# Patient Record
Sex: FEMALE | Race: WHITE | NOT HISPANIC OR LATINO | Employment: OTHER | URBAN - METROPOLITAN AREA
[De-identification: names, ages, dates, MRNs, and addresses within clinical notes are randomized per-mention and may not be internally consistent; named-entity substitution may affect disease eponyms.]

---

## 2018-08-13 ENCOUNTER — OFFICE VISIT (OUTPATIENT)
Dept: URGENT CARE | Facility: CLINIC | Age: 78
End: 2018-08-13
Payer: MEDICARE

## 2018-08-13 VITALS
WEIGHT: 157 LBS | TEMPERATURE: 97.1 F | BODY MASS INDEX: 28.89 KG/M2 | SYSTOLIC BLOOD PRESSURE: 120 MMHG | HEART RATE: 56 BPM | DIASTOLIC BLOOD PRESSURE: 60 MMHG | HEIGHT: 62 IN | OXYGEN SATURATION: 95 %

## 2018-08-13 DIAGNOSIS — H61.23 BILATERAL IMPACTED CERUMEN: ICD-10-CM

## 2018-08-13 PROCEDURE — 99204 OFFICE O/P NEW MOD 45 MIN: CPT | Performed by: PHYSICIAN ASSISTANT

## 2018-08-13 RX ORDER — AMLODIPINE BESYLATE 5 MG/1
5 TABLET ORAL 2 TIMES DAILY
COMMUNITY
Start: 2018-07-30

## 2018-08-13 RX ORDER — LISINOPRIL 10 MG/1
10 TABLET ORAL DAILY
COMMUNITY
End: 2020-02-02

## 2018-08-13 RX ORDER — PRAVASTATIN SODIUM 40 MG
40 TABLET ORAL DAILY
COMMUNITY
Start: 2018-07-03

## 2018-08-13 RX ORDER — ATENOLOL 50 MG/1
50 TABLET ORAL 2 TIMES DAILY
Status: ON HOLD | COMMUNITY
Start: 2018-07-30 | End: 2020-02-07

## 2018-08-13 ASSESSMENT — ENCOUNTER SYMPTOMS
FEVER: 0
CHILLS: 0
WEIGHT LOSS: 0
SINUS PAIN: 0
SORE THROAT: 0
ABDOMINAL PAIN: 0
VOMITING: 0
DIARRHEA: 0
CONSTIPATION: 0
COUGH: 0
SHORTNESS OF BREATH: 0
NAUSEA: 0

## 2018-08-13 ASSESSMENT — PATIENT HEALTH QUESTIONNAIRE - PHQ9: CLINICAL INTERPRETATION OF PHQ2 SCORE: 0

## 2018-08-13 NOTE — PATIENT INSTRUCTIONS
Earwax Buildup  Your ears make a substance called earwax. It may also be called cerumen. Sometimes, too much earwax builds up in your ear canal. This can cause ear pain and make it harder for you to hear.  CAUSES  This condition is caused by too much earwax production or buildup.  RISK FACTORS  The following factors may make you more likely to develop this condition:  · Cleaning your ears often with swabs.  · Having narrow ear canals.  · Having earwax that is overly thick or sticky.  · Having eczema.  · Being dehydrated.  SYMPTOMS  Symptoms of this condition include:  · Reduced hearing.  · Ear drainage.  · Ear pain.  · Ear itch.  · A feeling of fullness in the ear or feeling that the ear is plugged.  · Ringing in the ear.  · Coughing.  DIAGNOSIS  Your health care provider can diagnose this condition based on your symptoms and medical history. Your health care provider will also do an ear exam to look inside your ear with a scope (otoscope). You may also have a hearing test.  TREATMENT  Treatment for this condition includes:  · Over-the-counter or prescription ear drops to soften the earwax.  · Earwax removal by a health care provider. This may be done:  ¨ By flushing the ear with body-temperature water.  ¨ With a medical instrument that has a loop at the end (earwax curette).  ¨ With a suction device.  HOME CARE INSTRUCTIONS  · Take over-the-counter and prescription medicines only as told by your health care provider.  · Do not put any objects, including an ear swab, into your ear. You can clean the opening of your ear canal with a washcloth.  · Drink enough water to keep your urine clear or pale yellow.  · If you have frequent earwax buildup or you use hearing aids, consider seeing your health care provider every 6-12 months for routine preventive ear cleanings. Keep all follow-up visits as told by your health care provider.  SEEK MEDICAL CARE IF:  · You have ear pain.  · Your condition does not improve with  treatment.  · You have hearing loss.  · You have blood, pus, or other fluid coming from your ear.  This information is not intended to replace advice given to you by your health care provider. Make sure you discuss any questions you have with your health care provider.  Document Released: 01/25/2006 Document Revised: 04/10/2017 Document Reviewed: 08/03/2016  [employer] Interactive Patient Education © 2017 [employer]

## 2018-08-13 NOTE — PROGRESS NOTES
"Subjective:   Shama Herrera is a 77 y.o. female who presents for Cerumen Impaction     Ear Fullness   This is a new problem. The current episode started more than 1 month ago. The problem has been gradually worsening. Pertinent negatives include no abdominal pain, chills, coughing, fever, nausea, sore throat or vomiting. Nothing aggravates the symptoms. She has tried nothing for the symptoms.     Pt recently had hearing test done at [facility] where she was told her ears are cerumen impacted. She has noted significant hearing loss in L ear. No fever, chills or pain.     Review of Systems   Constitutional: Negative for chills, fever, malaise/fatigue and weight loss.   HENT: Positive for hearing loss. Negative for ear discharge, ear pain, sinus pain and sore throat.    Respiratory: Negative for cough and shortness of breath.    Gastrointestinal: Negative for abdominal pain, constipation, diarrhea, nausea and vomiting.   Genitourinary: Negative for dysuria.   All other systems reviewed and are negative.      PMH:  has no past medical history on file.  MEDS:   Current Outpatient Prescriptions:   •  lisinopril (PRINIVIL) 10 MG Tab, Take 10 mg by mouth every day., Disp: , Rfl:   •  amLODIPine (NORVASC) 5 MG Tab, Take 5 mg by mouth every day., Disp: , Rfl:   •  atenolol (TENORMIN) 50 MG Tab, Take 50 mg by mouth every day., Disp: , Rfl:   •  pravastatin (PRAVACHOL) 40 MG tablet, Take 40 mg by mouth every day., Disp: , Rfl:   ALLERGIES: Not on File  SURGHX: History reviewed. No pertinent surgical history.  SOCHX:  reports that she has never smoked. She has never used smokeless tobacco. She reports that she does not drink alcohol.  History reviewed. No pertinent family history.     Objective:   /60   Pulse (!) 56   Temp 36.2 °C (97.1 °F)   Ht 1.562 m (5' 1.5\")   Wt 71.2 kg (157 lb)   SpO2 95%   Breastfeeding? No   BMI 29.18 kg/m²     Physical Exam   Constitutional: She is oriented to person, place, and time. She appears " well-developed and well-nourished. No distress.   HENT:   Head: Normocephalic and atraumatic.   Right Ear: No swelling or tenderness. Tympanic membrane is not erythematous.   Left Ear: No swelling or tenderness. Tympanic membrane is not erythematous.   Bilateral cerumen impaction.    Eyes: Pupils are equal, round, and reactive to light. Conjunctivae are normal.   Cardiovascular: Normal rate and regular rhythm.    Pulmonary/Chest: Effort normal and breath sounds normal.   Neurological: She is alert and oriented to person, place, and time.   Skin: Skin is warm and dry.   Psychiatric: She has a normal mood and affect. Her behavior is normal.   Vitals reviewed.      Assessment/Plan:     1. Bilateral impacted cerumen         Significant cerumen removal of the L canal completed by MA using irrigation technique.  After multiple attempts the right canal cerumen unable to be irrigated. Recommended cerumen softening drops or olive oi drops daily x 4 days return to clinic for second attmept at cerumen removal. External canals sustain mild erythema no active bleeding present    Follow-up in 4 days, emergency room precautions discussed.  Patient appears understanding of information.     Case reviewed and discussed with Dk Alvarez PA-C during DONAL Cobian's training period.    And recommendations were discussed with the treating provider.     Dk Alvarez PA-C

## 2018-08-16 ENCOUNTER — OFFICE VISIT (OUTPATIENT)
Dept: URGENT CARE | Facility: CLINIC | Age: 78
End: 2018-08-16
Payer: MEDICARE

## 2018-08-16 VITALS
DIASTOLIC BLOOD PRESSURE: 70 MMHG | HEIGHT: 62 IN | OXYGEN SATURATION: 99 % | SYSTOLIC BLOOD PRESSURE: 118 MMHG | TEMPERATURE: 97.7 F | WEIGHT: 157 LBS | BODY MASS INDEX: 28.89 KG/M2 | HEART RATE: 54 BPM | RESPIRATION RATE: 16 BRPM

## 2018-08-16 DIAGNOSIS — H61.21 IMPACTED CERUMEN OF RIGHT EAR: ICD-10-CM

## 2018-08-16 PROCEDURE — 99212 OFFICE O/P EST SF 10 MIN: CPT | Performed by: NURSE PRACTITIONER

## 2018-08-16 ASSESSMENT — ENCOUNTER SYMPTOMS
CHILLS: 0
FEVER: 0
MYALGIAS: 0
HEADACHES: 0
DIZZINESS: 0
NAUSEA: 0

## 2018-08-16 ASSESSMENT — PAIN SCALES - GENERAL: PAINLEVEL: NO PAIN

## 2018-08-16 NOTE — PROGRESS NOTES
"Subjective:      Shama Herrera is a 77 y.o. female who presents with Ear Fullness            HPI Recurrent problem. 77 year old female with ear fullness of right ear. She was here on Monday and attempt at that time to clear was unsuccessful and she was told to use debrox but she did not. She is not having any hearing loss, dizziness, headache or nausea.  Patient has no known allergies.  Current Outpatient Prescriptions on File Prior to Visit   Medication Sig Dispense Refill   • lisinopril (PRINIVIL) 10 MG Tab Take 10 mg by mouth every day.     • amLODIPine (NORVASC) 5 MG Tab Take 5 mg by mouth every day.     • atenolol (TENORMIN) 50 MG Tab Take 50 mg by mouth every day.     • pravastatin (PRAVACHOL) 40 MG tablet Take 40 mg by mouth every day.       No current facility-administered medications on file prior to visit.      Social History     Social History   • Marital status:      Spouse name: N/A   • Number of children: N/A   • Years of education: N/A     Occupational History   • Not on file.     Social History Main Topics   • Smoking status: Never Smoker   • Smokeless tobacco: Never Used   • Alcohol use No   • Drug use: Unknown   • Sexual activity: Not on file     Other Topics Concern   • Not on file     Social History Narrative   • No narrative on file     family history is not on file.      Review of Systems   Constitutional: Negative for chills, fever and malaise/fatigue.   HENT: Negative for ear pain, hearing loss and tinnitus.    Gastrointestinal: Negative for nausea.   Musculoskeletal: Negative for myalgias.   Neurological: Negative for dizziness and headaches.          Objective:     /70   Pulse (!) 54   Temp 36.5 °C (97.7 °F)   Resp 16   Ht 1.562 m (5' 1.5\")   Wt 71.2 kg (157 lb)   SpO2 99%   Breastfeeding? No   BMI 29.18 kg/m²      Physical Exam   Constitutional: She appears well-developed and well-nourished.   HENT:   Head: Normocephalic and atraumatic.   Left Ear: External ear normal. "   Mouth/Throat: Oropharynx is clear and moist.   Right eac occluded with cerumen.   Musculoskeletal: Normal range of motion.   Moves all 4 extremities normally   Neurological: She is alert.   Skin: Skin is warm and dry.   Psychiatric: She has a normal mood and affect. Her behavior is normal. Thought content normal.   Nursing note and vitals reviewed.              Assessment/Plan:     1. Impacted cerumen of right ear       Clear after irrigation.  Follow up as needed.

## 2019-08-09 ENCOUNTER — HOSPITAL ENCOUNTER (OUTPATIENT)
Dept: HOSPITAL 8 - CFH | Age: 79
Discharge: HOME | End: 2019-08-09
Attending: FAMILY MEDICINE
Payer: MEDICARE

## 2019-08-09 DIAGNOSIS — M85.88: Primary | ICD-10-CM

## 2019-08-09 PROCEDURE — 77080 DXA BONE DENSITY AXIAL: CPT

## 2019-10-23 ENCOUNTER — APPOINTMENT (RX ONLY)
Dept: URBAN - METROPOLITAN AREA CLINIC 20 | Facility: CLINIC | Age: 79
Setting detail: DERMATOLOGY
End: 2019-10-23

## 2019-10-23 DIAGNOSIS — L90.8 OTHER ATROPHIC DISORDERS OF SKIN: ICD-10-CM

## 2019-10-23 DIAGNOSIS — L82.1 OTHER SEBORRHEIC KERATOSIS: ICD-10-CM

## 2019-10-23 DIAGNOSIS — L72.8 OTHER FOLLICULAR CYSTS OF THE SKIN AND SUBCUTANEOUS TISSUE: ICD-10-CM

## 2019-10-23 DIAGNOSIS — D22 MELANOCYTIC NEVI: ICD-10-CM

## 2019-10-23 PROBLEM — D22.4 MELANOCYTIC NEVI OF SCALP AND NECK: Status: ACTIVE | Noted: 2019-10-23

## 2019-10-23 PROCEDURE — 99202 OFFICE O/P NEW SF 15 MIN: CPT

## 2019-10-23 PROCEDURE — ? ADDITIONAL NOTES

## 2019-10-23 PROCEDURE — ? COUNSELING

## 2019-10-23 ASSESSMENT — LOCATION ZONE DERM
LOCATION ZONE: TRUNK
LOCATION ZONE: FACE
LOCATION ZONE: NECK

## 2019-10-23 ASSESSMENT — LOCATION DETAILED DESCRIPTION DERM
LOCATION DETAILED: RIGHT SUPERIOR MEDIAL FOREHEAD
LOCATION DETAILED: RIGHT MEDIAL TRAPEZIAL NECK
LOCATION DETAILED: RIGHT SUPERIOR UPPER BACK

## 2019-10-23 ASSESSMENT — LOCATION SIMPLE DESCRIPTION DERM
LOCATION SIMPLE: POSTERIOR NECK
LOCATION SIMPLE: RIGHT UPPER BACK
LOCATION SIMPLE: RIGHT FOREHEAD

## 2019-10-23 NOTE — PROCEDURE: ADDITIONAL NOTES
Additional Notes: Recommended Cera Ve moisturizing cream for daily maintenance.
Detail Level: Simple

## 2020-01-20 ENCOUNTER — APPOINTMENT (OUTPATIENT)
Dept: RADIOLOGY | Facility: MEDICAL CENTER | Age: 80
End: 2020-01-20
Attending: EMERGENCY MEDICINE
Payer: MEDICARE

## 2020-01-20 ENCOUNTER — HOSPITAL ENCOUNTER (EMERGENCY)
Facility: MEDICAL CENTER | Age: 80
End: 2020-01-20
Attending: EMERGENCY MEDICINE
Payer: MEDICARE

## 2020-01-20 VITALS
OXYGEN SATURATION: 96 % | HEIGHT: 61 IN | BODY MASS INDEX: 29.8 KG/M2 | SYSTOLIC BLOOD PRESSURE: 143 MMHG | WEIGHT: 157.85 LBS | TEMPERATURE: 98.2 F | RESPIRATION RATE: 26 BRPM | HEART RATE: 57 BPM | DIASTOLIC BLOOD PRESSURE: 52 MMHG

## 2020-01-20 DIAGNOSIS — S52.502A CLOSED FRACTURE OF DISTAL END OF LEFT RADIUS, UNSPECIFIED FRACTURE MORPHOLOGY, INITIAL ENCOUNTER: ICD-10-CM

## 2020-01-20 LAB
ANION GAP SERPL CALC-SCNC: 11 MMOL/L (ref 0–11.9)
APTT PPP: 36.5 SEC (ref 24.7–36)
BASOPHILS # BLD AUTO: 0.3 % (ref 0–1.8)
BASOPHILS # BLD: 0.03 K/UL (ref 0–0.12)
BUN SERPL-MCNC: 30 MG/DL (ref 8–22)
CALCIUM SERPL-MCNC: 9.9 MG/DL (ref 8.4–10.2)
CHLORIDE SERPL-SCNC: 106 MMOL/L (ref 96–112)
CO2 SERPL-SCNC: 25 MMOL/L (ref 20–33)
CREAT SERPL-MCNC: 1.06 MG/DL (ref 0.5–1.4)
EOSINOPHIL # BLD AUTO: 0.13 K/UL (ref 0–0.51)
EOSINOPHIL NFR BLD: 1.4 % (ref 0–6.9)
ERYTHROCYTE [DISTWIDTH] IN BLOOD BY AUTOMATED COUNT: 45.6 FL (ref 35.9–50)
GLUCOSE SERPL-MCNC: 126 MG/DL (ref 65–99)
HCT VFR BLD AUTO: 40.6 % (ref 37–47)
HGB BLD-MCNC: 13.1 G/DL (ref 12–16)
IMM GRANULOCYTES # BLD AUTO: 0.02 K/UL (ref 0–0.11)
IMM GRANULOCYTES NFR BLD AUTO: 0.2 % (ref 0–0.9)
INR PPP: 1.02 (ref 0.87–1.13)
LYMPHOCYTES # BLD AUTO: 1.25 K/UL (ref 1–4.8)
LYMPHOCYTES NFR BLD: 13.3 % (ref 22–41)
MCH RBC QN AUTO: 29.5 PG (ref 27–33)
MCHC RBC AUTO-ENTMCNC: 32.3 G/DL (ref 33.6–35)
MCV RBC AUTO: 91.4 FL (ref 81.4–97.8)
MONOCYTES # BLD AUTO: 0.83 K/UL (ref 0–0.85)
MONOCYTES NFR BLD AUTO: 8.9 % (ref 0–13.4)
NEUTROPHILS # BLD AUTO: 7.11 K/UL (ref 2–7.15)
NEUTROPHILS NFR BLD: 75.9 % (ref 44–72)
NRBC # BLD AUTO: 0 K/UL
NRBC BLD-RTO: 0 /100 WBC
PLATELET # BLD AUTO: 182 K/UL (ref 164–446)
PMV BLD AUTO: 12.1 FL (ref 9–12.9)
POTASSIUM SERPL-SCNC: 4.4 MMOL/L (ref 3.6–5.5)
PROTHROMBIN TIME: 13.5 SEC (ref 12–14.6)
RBC # BLD AUTO: 4.44 M/UL (ref 4.2–5.4)
SODIUM SERPL-SCNC: 142 MMOL/L (ref 135–145)
WBC # BLD AUTO: 9.4 K/UL (ref 4.8–10.8)

## 2020-01-20 PROCEDURE — 85610 PROTHROMBIN TIME: CPT

## 2020-01-20 PROCEDURE — 700101 HCHG RX REV CODE 250

## 2020-01-20 PROCEDURE — 96375 TX/PRO/DX INJ NEW DRUG ADDON: CPT | Mod: XU

## 2020-01-20 PROCEDURE — 25605 CLTX DST RDL FX/EPHYS SEP W/: CPT

## 2020-01-20 PROCEDURE — 96374 THER/PROPH/DIAG INJ IV PUSH: CPT | Mod: XU

## 2020-01-20 PROCEDURE — 73100 X-RAY EXAM OF WRIST: CPT | Mod: LT

## 2020-01-20 PROCEDURE — 700111 HCHG RX REV CODE 636 W/ 250 OVERRIDE (IP): Performed by: EMERGENCY MEDICINE

## 2020-01-20 PROCEDURE — 80048 BASIC METABOLIC PNL TOTAL CA: CPT

## 2020-01-20 PROCEDURE — 99152 MOD SED SAME PHYS/QHP 5/>YRS: CPT

## 2020-01-20 PROCEDURE — 99285 EMERGENCY DEPT VISIT HI MDM: CPT

## 2020-01-20 PROCEDURE — A9270 NON-COVERED ITEM OR SERVICE: HCPCS | Performed by: EMERGENCY MEDICINE

## 2020-01-20 PROCEDURE — 73130 X-RAY EXAM OF HAND: CPT | Mod: LT

## 2020-01-20 PROCEDURE — 73110 X-RAY EXAM OF WRIST: CPT | Mod: LT

## 2020-01-20 PROCEDURE — 700102 HCHG RX REV CODE 250 W/ 637 OVERRIDE(OP): Performed by: EMERGENCY MEDICINE

## 2020-01-20 PROCEDURE — 85025 COMPLETE CBC W/AUTO DIFF WBC: CPT

## 2020-01-20 PROCEDURE — 85730 THROMBOPLASTIN TIME PARTIAL: CPT

## 2020-01-20 RX ORDER — ONDANSETRON 2 MG/ML
4 INJECTION INTRAMUSCULAR; INTRAVENOUS ONCE
Status: COMPLETED | OUTPATIENT
Start: 2020-01-20 | End: 2020-01-20

## 2020-01-20 RX ORDER — IBUPROFEN 600 MG/1
600 TABLET ORAL ONCE
Status: COMPLETED | OUTPATIENT
Start: 2020-01-20 | End: 2020-01-20

## 2020-01-20 RX ORDER — KETAMINE HYDROCHLORIDE 50 MG/ML
1 INJECTION, SOLUTION INTRAMUSCULAR; INTRAVENOUS ONCE
Status: DISCONTINUED | OUTPATIENT
Start: 2020-01-20 | End: 2020-01-21 | Stop reason: HOSPADM

## 2020-01-20 RX ORDER — OXYCODONE HYDROCHLORIDE 5 MG/1
5 TABLET ORAL EVERY 4 HOURS PRN
Qty: 20 TAB | Refills: 0 | Status: SHIPPED | OUTPATIENT
Start: 2020-01-20 | End: 2020-01-27

## 2020-01-20 RX ORDER — ACETAMINOPHEN 325 MG/1
650 TABLET ORAL ONCE
Status: COMPLETED | OUTPATIENT
Start: 2020-01-20 | End: 2020-01-20

## 2020-01-20 RX ORDER — KETAMINE HYDROCHLORIDE 50 MG/ML
1 INJECTION, SOLUTION INTRAMUSCULAR; INTRAVENOUS ONCE
Status: COMPLETED | OUTPATIENT
Start: 2020-01-20 | End: 2020-01-20

## 2020-01-20 RX ORDER — KETAMINE HYDROCHLORIDE 50 MG/ML
INJECTION, SOLUTION INTRAMUSCULAR; INTRAVENOUS
Status: COMPLETED
Start: 2020-01-20 | End: 2020-01-20

## 2020-01-20 RX ADMIN — KETAMINE HYDROCHLORIDE 71.5 MG: 50 INJECTION, SOLUTION INTRAMUSCULAR; INTRAVENOUS at 19:40

## 2020-01-20 RX ADMIN — KETAMINE HYDROCHLORIDE 71.5 MG: 50 INJECTION INTRAMUSCULAR; INTRAVENOUS at 19:40

## 2020-01-20 RX ADMIN — ONDANSETRON 4 MG: 2 INJECTION INTRAMUSCULAR; INTRAVENOUS at 18:59

## 2020-01-20 RX ADMIN — IBUPROFEN 600 MG: 600 TABLET ORAL at 21:11

## 2020-01-20 RX ADMIN — ACETAMINOPHEN 650 MG: 325 TABLET, FILM COATED ORAL at 21:11

## 2020-01-20 RX ADMIN — FENTANYL CITRATE 50 MCG: 50 INJECTION INTRAMUSCULAR; INTRAVENOUS at 17:55

## 2020-01-20 ASSESSMENT — PAIN DESCRIPTION - DESCRIPTORS: DESCRIPTORS: SHARP

## 2020-01-21 NOTE — ED TRIAGE NOTES
Pt c/o fractured L wrist about 1 hour ago. Pt states that she tripped over carpet and landed on her wrist. Deformity noted. Pt denies falling due to dizziness. Pt denies LOC. Pt maintains good CMS to left fingers.

## 2020-01-21 NOTE — RESPIRATORY CARE
Conscious sedation given for wrist reduction. Patient tolerated procedure well on 4-5LPM with O2 sats 92-98%. ETCO2 20-32. RR 10-20 throughout procedure. Jaw thrust performed for 3 minutes.

## 2020-01-21 NOTE — DISCHARGE INSTRUCTIONS
You were seen in the ER for a break of your left wrist.  We attempted to reduce the bone to put it back in its place which only partially worked.  You will need to follow-up with our orthopedic surgery team and I have spoken with the orthopedic surgeon on-call who is requested that you follow-up this Wednesday.  Please call their office to make an appointment.  We have placed a splint on your wrist, please keep it clean and dry.  I recommend taking Tylenol and/or ibuprofen as directed on the bottle for pain control.  I have given you a small prescription for oxycodone which is a narcotic/opioid pain medication.  Please do not take this medication and drink alcohol or drive as it will make you sleepy.  If you develop any new or worsening symptoms you should return immediately to the ER.

## 2020-01-21 NOTE — ED PROVIDER NOTES
"ED Provider Note    CHIEF COMPLAINT  Chief Complaint   Patient presents with   • Fall   • Wrist Injury     Left       HPI  Shama Herrera is a 79 y.o. left hand dominant female who presents with a chief complaint of left wrist pain and deformity.  The patient reports she was in her baseline state of health until just prior to arrival when she tripped it over a carpet and landed on her left wrist.  She had immediate pain in the left wrist.  She did not hit her head nor did she lose consciousness.  She is not anticoagulated.  She has not taken any medication for her symptoms.  No other complaints.    REVIEW OF SYSTEMS  See HPI for further details.  Left wrist pain and deformity.  All other systems are negative.     PAST MEDICAL HISTORY       SOCIAL HISTORY  Social History     Tobacco Use   • Smoking status: Never Smoker   • Smokeless tobacco: Never Used   Substance and Sexual Activity   • Alcohol use: No   • Drug use: Not on file   • Sexual activity: Not on file       SURGICAL HISTORY  patient denies any surgical history    CURRENT MEDICATIONS  Home Medications    **Home medications have not yet been reviewed for this encounter**         ALLERGIES  No Known Allergies    PHYSICAL EXAM  VITAL SIGNS: BP (!) 164/63   Pulse (!) 56   Temp 36.8 °C (98.2 °F) (Temporal)   Resp 16   Ht 1.549 m (5' 1\")   Wt 71.6 kg (157 lb 13.6 oz)   SpO2 92%   BMI 29.83 kg/m²    Pulse ox interpretation: I interpret this pulse ox as normal.  Constitutional: Alert in no apparent distress.  HENT: No signs of trauma, Bilateral external ears normal, Nose normal.  Moist mucous membranes.  Eyes: Pupils are equal and reactive, Conjunctiva normal, Non-icteric.   Neck: Normal range of motion, No tenderness, Supple, No stridor.   Lymphatic: No lymphadenopathy noted.   Cardiovascular: Regular rate and rhythm, no murmurs.  2+ left radial pulse.  Thorax & Lungs: Normal breath sounds, No respiratory distress, No wheezing, No chest tenderness.   Abdomen: " Bowel sounds normal, Soft, No tenderness, No masses, No pulsatile masses. No peritoneal signs.  Skin: Warm, Dry, No erythema, No rash.   Back: Normal alignment.  Extremities: Intact distal pulses, left wrist deformity with associated tenderness.  Musculoskeletal: Decreased range of motion of left wrist secondary to pain.   Neurologic: Alert, Normal motor function, normal sensory.  Psychiatric: Affect normal, Judgment normal, Mood normal.       DIAGNOSTIC STUDIES / PROCEDURES    CONSCIOUS SEDATION PROCEDURE NOTE:  Patient identification was confirmed and patient consent was obtained.  The procedure was performed by Dr. Nichols.  Anesthetic used: Ketamine  Length of Time: 25 minutes  Other medications administered: None  Pre-procedure neuro examination revealed no deficits. Patient's airway remained patent, O2SAT adequate through procedure. Vitals remained stable. Patient tolerated procedure well without complications. Post-procedure exam showed patient w/o cranial deficits. Sensory and motor intact. Patient returned to baseline prior to disposition.  Instructions for care discussed verbally and pt provided with additional written instructions for homecare and f/u.    FRACTURE REDUCTION  Reduction was accomplished using direct traction. The left arm was placed in a sugartong splint. Patient was neurovascularly intact pre- and post-procedure. Patient tolerated the procedure well. Post-procedure xrays reveals mild improvement of distal radius fracture.    LABS  CBC  BMP  PT/INR  PTT    RADIOLOGY  Xray left wrist  Xray left hand    COURSE & MEDICAL DECISION MAKING  Pertinent Labs & Imaging studies reviewed. (See chart for details)    This is a 79 year old female here with obvious left wrist fracture s/p mechanical GLF. Patient adamant that she tripped over a rug. Did not hit her head. No symptoms prior to falling. She is neurovascularly intact in the LUE. No elbow or shoulder tenderness. Deformity to the left wrist.      Xrays confirm distal radius fracture with likely scapholunate ligamentous injury due to widening of the scapho-lunate space.    Spoke with Dr. Sousa, on-call orthopedist, who is comfortable with plan for sedation, reduction, and outpatient management.     Reduction performed as above with only mild improvement of the alignment. Spoke again with Dr. Sousa who viewed the post-reduction films. Recommendation is to have the patient follow up in clinic. She will likely need operative management.    Patient resting comfortably. She has tolerated PO. She has ambulated. Pain is well managed. Follow up plan was discussed with the patient. She was given a sling. She should take tylenol/ibuprofen for pain control. She was given a prescription for oxycodone for breakthrough pain. Keep the wrist elevated. Given the contact information for ortho clinic. Return to the ER with any new or worsening symptoms.    The patient will not drink alcohol nor drive with prescribed medications. The patient will return for worsening symptoms and is stable at the time of discharge. The patient verbalizes understanding and will comply.    FINAL IMPRESSION  1. Distal left radius fracture    Electronically signed by: Matt Nichols M.D., 1/20/2020 5:49 PM

## 2020-01-23 ENCOUNTER — HOSPITAL ENCOUNTER (OUTPATIENT)
Dept: HOSPITAL 8 - OUT | Age: 80
Discharge: HOME | End: 2020-01-23
Attending: ORTHOPAEDIC SURGERY
Payer: MEDICARE

## 2020-01-23 VITALS — DIASTOLIC BLOOD PRESSURE: 74 MMHG | SYSTOLIC BLOOD PRESSURE: 157 MMHG

## 2020-01-23 VITALS — WEIGHT: 158.07 LBS | BODY MASS INDEX: 28.01 KG/M2 | HEIGHT: 63 IN

## 2020-01-23 DIAGNOSIS — Z72.89: ICD-10-CM

## 2020-01-23 DIAGNOSIS — I10: ICD-10-CM

## 2020-01-23 DIAGNOSIS — S52.552A: Primary | ICD-10-CM

## 2020-01-23 DIAGNOSIS — J44.9: ICD-10-CM

## 2020-01-23 DIAGNOSIS — E78.00: ICD-10-CM
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
## 2020-01-23 LAB
ALBUMIN SERPL-MCNC: 3.7 G/DL (ref 3.4–5)
ALP SERPL-CCNC: 71 U/L (ref 45–117)
ALT SERPL-CCNC: 13 U/L (ref 12–78)
ANION GAP SERPL CALC-SCNC: 7 MMOL/L (ref 5–15)
BILIRUB SERPL-MCNC: 0.7 MG/DL (ref 0.2–1)
CALCIUM SERPL-MCNC: 9.4 MG/DL (ref 8.5–10.1)
CHLORIDE SERPL-SCNC: 109 MMOL/L (ref 98–107)
CREAT SERPL-MCNC: 1.06 MG/DL (ref 0.55–1.02)
PROT SERPL-MCNC: 7.7 G/DL (ref 6.4–8.2)

## 2020-01-23 PROCEDURE — 93005 ELECTROCARDIOGRAM TRACING: CPT

## 2020-01-23 PROCEDURE — 25607 OPTX DST RD XARTC FX/EPI SEP: CPT

## 2020-01-23 PROCEDURE — 80053 COMPREHEN METABOLIC PANEL: CPT

## 2020-01-23 PROCEDURE — 36415 COLL VENOUS BLD VENIPUNCTURE: CPT

## 2020-01-23 PROCEDURE — 73100 X-RAY EXAM OF WRIST: CPT

## 2020-01-23 PROCEDURE — C1713 ANCHOR/SCREW BN/BN,TIS/BN: HCPCS

## 2020-01-23 PROCEDURE — 64415 NJX AA&/STRD BRCH PLXS IMG: CPT

## 2020-01-23 PROCEDURE — 76000 FLUOROSCOPY <1 HR PHYS/QHP: CPT

## 2020-02-02 ENCOUNTER — HOSPITAL ENCOUNTER (INPATIENT)
Facility: MEDICAL CENTER | Age: 80
LOS: 7 days | DRG: 872 | End: 2020-02-10
Attending: EMERGENCY MEDICINE | Admitting: HOSPITALIST
Payer: MEDICARE

## 2020-02-02 ENCOUNTER — APPOINTMENT (OUTPATIENT)
Dept: RADIOLOGY | Facility: MEDICAL CENTER | Age: 80
DRG: 872 | End: 2020-02-02
Attending: EMERGENCY MEDICINE
Payer: MEDICARE

## 2020-02-02 DIAGNOSIS — S52.502A CLOSED FRACTURE OF DISTAL END OF LEFT RADIUS, UNSPECIFIED FRACTURE MORPHOLOGY, INITIAL ENCOUNTER: ICD-10-CM

## 2020-02-02 DIAGNOSIS — I10 ESSENTIAL HYPERTENSION: ICD-10-CM

## 2020-02-02 DIAGNOSIS — R53.1 GENERALIZED WEAKNESS: ICD-10-CM

## 2020-02-02 DIAGNOSIS — S52.501A CLOSED FRACTURE OF DISTAL END OF RIGHT RADIUS, UNSPECIFIED FRACTURE MORPHOLOGY, INITIAL ENCOUNTER: ICD-10-CM

## 2020-02-02 PROBLEM — D72.829 LEUKOCYTOSIS: Status: ACTIVE | Noted: 2020-02-02

## 2020-02-02 LAB
ALBUMIN SERPL BCP-MCNC: 3.4 G/DL (ref 3.2–4.9)
ALBUMIN/GLOB SERPL: 0.9 G/DL
ALP SERPL-CCNC: 81 U/L (ref 30–99)
ALT SERPL-CCNC: 10 U/L (ref 2–50)
ANION GAP SERPL CALC-SCNC: 14 MMOL/L (ref 0–11.9)
APPEARANCE UR: ABNORMAL
AST SERPL-CCNC: 19 U/L (ref 12–45)
BACTERIA #/AREA URNS HPF: ABNORMAL /HPF
BASOPHILS # BLD AUTO: 0.2 % (ref 0–1.8)
BASOPHILS # BLD: 0.03 K/UL (ref 0–0.12)
BILIRUB SERPL-MCNC: 0.6 MG/DL (ref 0.1–1.5)
BILIRUB UR QL STRIP.AUTO: NEGATIVE
BUN SERPL-MCNC: 20 MG/DL (ref 8–22)
CALCIUM SERPL-MCNC: 9.8 MG/DL (ref 8.4–10.2)
CHLORIDE SERPL-SCNC: 106 MMOL/L (ref 96–112)
CO2 SERPL-SCNC: 22 MMOL/L (ref 20–33)
COLOR UR: YELLOW
CREAT SERPL-MCNC: 0.82 MG/DL (ref 0.5–1.4)
EOSINOPHIL # BLD AUTO: 0.01 K/UL (ref 0–0.51)
EOSINOPHIL NFR BLD: 0.1 % (ref 0–6.9)
EPI CELLS #/AREA URNS HPF: ABNORMAL /HPF
ERYTHROCYTE [DISTWIDTH] IN BLOOD BY AUTOMATED COUNT: 46.4 FL (ref 35.9–50)
GLOBULIN SER CALC-MCNC: 3.9 G/DL (ref 1.9–3.5)
GLUCOSE SERPL-MCNC: 115 MG/DL (ref 65–99)
GLUCOSE UR STRIP.AUTO-MCNC: NEGATIVE MG/DL
HCT VFR BLD AUTO: 37.2 % (ref 37–47)
HGB BLD-MCNC: 11.5 G/DL (ref 12–16)
IMM GRANULOCYTES # BLD AUTO: 0.05 K/UL (ref 0–0.11)
IMM GRANULOCYTES NFR BLD AUTO: 0.4 % (ref 0–0.9)
KETONES UR STRIP.AUTO-MCNC: ABNORMAL MG/DL
LEUKOCYTE ESTERASE UR QL STRIP.AUTO: ABNORMAL
LYMPHOCYTES # BLD AUTO: 0.85 K/UL (ref 1–4.8)
LYMPHOCYTES NFR BLD: 6.7 % (ref 22–41)
MCH RBC QN AUTO: 29 PG (ref 27–33)
MCHC RBC AUTO-ENTMCNC: 30.9 G/DL (ref 33.6–35)
MCV RBC AUTO: 93.7 FL (ref 81.4–97.8)
MICRO URNS: ABNORMAL
MONOCYTES # BLD AUTO: 1.13 K/UL (ref 0–0.85)
MONOCYTES NFR BLD AUTO: 8.9 % (ref 0–13.4)
MUCOUS THREADS #/AREA URNS HPF: ABNORMAL /HPF
NEUTROPHILS # BLD AUTO: 10.66 K/UL (ref 2–7.15)
NEUTROPHILS NFR BLD: 83.7 % (ref 44–72)
NITRITE UR QL STRIP.AUTO: POSITIVE
NRBC # BLD AUTO: 0 K/UL
NRBC BLD-RTO: 0 /100 WBC
PH UR STRIP.AUTO: 5.5 [PH] (ref 5–8)
PLATELET # BLD AUTO: 147 K/UL (ref 164–446)
PMV BLD AUTO: 12.1 FL (ref 9–12.9)
POTASSIUM SERPL-SCNC: 4 MMOL/L (ref 3.6–5.5)
PROT SERPL-MCNC: 7.3 G/DL (ref 6–8.2)
PROT UR QL STRIP: NEGATIVE MG/DL
RBC # BLD AUTO: 3.97 M/UL (ref 4.2–5.4)
RBC # URNS HPF: ABNORMAL /HPF
RBC UR QL AUTO: ABNORMAL
SODIUM SERPL-SCNC: 142 MMOL/L (ref 135–145)
SP GR UR STRIP.AUTO: 1.02
T4 FREE SERPL-MCNC: 1.29 NG/DL (ref 0.58–1.64)
TROPONIN T SERPL-MCNC: 24 NG/L (ref 6–19)
TROPONIN T SERPL-MCNC: 28 NG/L (ref 6–19)
TSH SERPL DL<=0.005 MIU/L-ACNC: 0.25 UIU/ML (ref 0.38–5.33)
WBC # BLD AUTO: 12.7 K/UL (ref 4.8–10.8)
WBC #/AREA URNS HPF: ABNORMAL /HPF

## 2020-02-02 PROCEDURE — 700111 HCHG RX REV CODE 636 W/ 250 OVERRIDE (IP): Performed by: HOSPITALIST

## 2020-02-02 PROCEDURE — 700102 HCHG RX REV CODE 250 W/ 637 OVERRIDE(OP): Performed by: HOSPITALIST

## 2020-02-02 PROCEDURE — A9270 NON-COVERED ITEM OR SERVICE: HCPCS | Performed by: HOSPITALIST

## 2020-02-02 PROCEDURE — 36415 COLL VENOUS BLD VENIPUNCTURE: CPT

## 2020-02-02 PROCEDURE — G0378 HOSPITAL OBSERVATION PER HR: HCPCS

## 2020-02-02 PROCEDURE — 700105 HCHG RX REV CODE 258: Performed by: EMERGENCY MEDICINE

## 2020-02-02 PROCEDURE — 85025 COMPLETE CBC W/AUTO DIFF WBC: CPT

## 2020-02-02 PROCEDURE — 71045 X-RAY EXAM CHEST 1 VIEW: CPT

## 2020-02-02 PROCEDURE — 96365 THER/PROPH/DIAG IV INF INIT: CPT

## 2020-02-02 PROCEDURE — 99285 EMERGENCY DEPT VISIT HI MDM: CPT

## 2020-02-02 PROCEDURE — 84484 ASSAY OF TROPONIN QUANT: CPT

## 2020-02-02 PROCEDURE — 700105 HCHG RX REV CODE 258: Performed by: HOSPITALIST

## 2020-02-02 PROCEDURE — 99219 PR INITIAL OBSERVATION CARE,LEVL II: CPT | Performed by: HOSPITALIST

## 2020-02-02 PROCEDURE — 80053 COMPREHEN METABOLIC PANEL: CPT

## 2020-02-02 PROCEDURE — 96372 THER/PROPH/DIAG INJ SC/IM: CPT

## 2020-02-02 PROCEDURE — 81001 URINALYSIS AUTO W/SCOPE: CPT

## 2020-02-02 PROCEDURE — 84439 ASSAY OF FREE THYROXINE: CPT

## 2020-02-02 PROCEDURE — 84443 ASSAY THYROID STIM HORMONE: CPT

## 2020-02-02 RX ORDER — ONDANSETRON 4 MG/1
4 TABLET, ORALLY DISINTEGRATING ORAL EVERY 4 HOURS PRN
Status: DISCONTINUED | OUTPATIENT
Start: 2020-02-02 | End: 2020-02-10 | Stop reason: HOSPADM

## 2020-02-02 RX ORDER — BISACODYL 10 MG
10 SUPPOSITORY, RECTAL RECTAL
Status: DISCONTINUED | OUTPATIENT
Start: 2020-02-02 | End: 2020-02-10 | Stop reason: HOSPADM

## 2020-02-02 RX ORDER — SODIUM CHLORIDE, SODIUM LACTATE, POTASSIUM CHLORIDE, CALCIUM CHLORIDE 600; 310; 30; 20 MG/100ML; MG/100ML; MG/100ML; MG/100ML
INJECTION, SOLUTION INTRAVENOUS CONTINUOUS
Status: DISCONTINUED | OUTPATIENT
Start: 2020-02-02 | End: 2020-02-05

## 2020-02-02 RX ORDER — ONDANSETRON 2 MG/ML
4 INJECTION INTRAMUSCULAR; INTRAVENOUS EVERY 4 HOURS PRN
Status: DISCONTINUED | OUTPATIENT
Start: 2020-02-02 | End: 2020-02-10 | Stop reason: HOSPADM

## 2020-02-02 RX ORDER — AMOXICILLIN 250 MG
2 CAPSULE ORAL 2 TIMES DAILY
Status: DISCONTINUED | OUTPATIENT
Start: 2020-02-02 | End: 2020-02-10 | Stop reason: HOSPADM

## 2020-02-02 RX ORDER — LISINOPRIL 5 MG/1
10 TABLET ORAL DAILY
Status: DISCONTINUED | OUTPATIENT
Start: 2020-02-02 | End: 2020-02-10 | Stop reason: HOSPADM

## 2020-02-02 RX ORDER — HYDROCODONE BITARTRATE AND ACETAMINOPHEN 5; 325 MG/1; MG/1
1-2 TABLET ORAL EVERY 4 HOURS PRN
Status: ON HOLD | COMMUNITY
End: 2020-02-07

## 2020-02-02 RX ORDER — SODIUM CHLORIDE 9 MG/ML
INJECTION, SOLUTION INTRAVENOUS CONTINUOUS
Status: DISCONTINUED | OUTPATIENT
Start: 2020-02-02 | End: 2020-02-03

## 2020-02-02 RX ORDER — ALENDRONATE SODIUM 70 MG/1
70 TABLET ORAL
COMMUNITY

## 2020-02-02 RX ORDER — POLYETHYLENE GLYCOL 3350 17 G/17G
1 POWDER, FOR SOLUTION ORAL
Status: DISCONTINUED | OUTPATIENT
Start: 2020-02-02 | End: 2020-02-10 | Stop reason: HOSPADM

## 2020-02-02 RX ORDER — LISINOPRIL 40 MG/1
40 TABLET ORAL DAILY
COMMUNITY
Start: 2020-01-14

## 2020-02-02 RX ORDER — ATENOLOL 25 MG/1
50 TABLET ORAL DAILY
Status: DISCONTINUED | OUTPATIENT
Start: 2020-02-02 | End: 2020-02-10 | Stop reason: HOSPADM

## 2020-02-02 RX ORDER — PRAVASTATIN SODIUM 20 MG
40 TABLET ORAL DAILY
Status: DISCONTINUED | OUTPATIENT
Start: 2020-02-02 | End: 2020-02-10 | Stop reason: HOSPADM

## 2020-02-02 RX ORDER — IBUPROFEN 200 MG
400 TABLET ORAL EVERY 6 HOURS PRN
COMMUNITY

## 2020-02-02 RX ORDER — AMLODIPINE BESYLATE 5 MG/1
5 TABLET ORAL DAILY
Status: DISCONTINUED | OUTPATIENT
Start: 2020-02-02 | End: 2020-02-10 | Stop reason: HOSPADM

## 2020-02-02 RX ORDER — ACETAMINOPHEN 325 MG/1
650 TABLET ORAL EVERY 6 HOURS PRN
Status: DISCONTINUED | OUTPATIENT
Start: 2020-02-02 | End: 2020-02-10 | Stop reason: HOSPADM

## 2020-02-02 RX ADMIN — LISINOPRIL 10 MG: 5 TABLET ORAL at 15:17

## 2020-02-02 RX ADMIN — PRAVASTATIN SODIUM 40 MG: 20 TABLET ORAL at 15:16

## 2020-02-02 RX ADMIN — CEFTRIAXONE SODIUM 1 G: 1 INJECTION, POWDER, FOR SOLUTION INTRAMUSCULAR; INTRAVENOUS at 20:28

## 2020-02-02 RX ADMIN — ATENOLOL 50 MG: 25 TABLET ORAL at 15:17

## 2020-02-02 RX ADMIN — SODIUM CHLORIDE 1000 ML: 9 INJECTION, SOLUTION INTRAVENOUS at 11:19

## 2020-02-02 RX ADMIN — AMLODIPINE BESYLATE 5 MG: 5 TABLET ORAL at 15:18

## 2020-02-02 RX ADMIN — SODIUM CHLORIDE, POTASSIUM CHLORIDE, SODIUM LACTATE AND CALCIUM CHLORIDE: 600; 310; 30; 20 INJECTION, SOLUTION INTRAVENOUS at 14:02

## 2020-02-02 RX ADMIN — SENNOSIDES AND DOCUSATE SODIUM 2 TABLET: 8.6; 5 TABLET ORAL at 18:11

## 2020-02-02 RX ADMIN — ACETAMINOPHEN 650 MG: 325 TABLET, FILM COATED ORAL at 22:28

## 2020-02-02 RX ADMIN — ENOXAPARIN SODIUM 40 MG: 100 INJECTION SUBCUTANEOUS at 15:19

## 2020-02-02 ASSESSMENT — ENCOUNTER SYMPTOMS
SPEECH CHANGE: 0
PALPITATIONS: 0
TINGLING: 0
EYE PAIN: 0
PHOTOPHOBIA: 0
CLAUDICATION: 0
DIZZINESS: 0
NERVOUS/ANXIOUS: 0
HEMOPTYSIS: 0
VOMITING: 0
BLURRED VISION: 0
CHILLS: 0
DOUBLE VISION: 0
COUGH: 0
HEADACHES: 0
NECK PAIN: 0
SHORTNESS OF BREATH: 0
STRIDOR: 0
NAUSEA: 0
MYALGIAS: 1
BACK PAIN: 0
SORE THROAT: 0
HEARTBURN: 0
DEPRESSION: 0
PND: 0
SENSORY CHANGE: 0
WEAKNESS: 1
SPUTUM PRODUCTION: 0
FEVER: 0
CONSTIPATION: 0
MEMORY LOSS: 0
BLOOD IN STOOL: 0
ORTHOPNEA: 0
TREMORS: 0

## 2020-02-02 ASSESSMENT — LIFESTYLE VARIABLES
ALCOHOL_USE: NO
EVER FELT BAD OR GUILTY ABOUT YOUR DRINKING: NO
HAVE YOU EVER FELT YOU SHOULD CUT DOWN ON YOUR DRINKING: NO
HOW MANY TIMES IN THE PAST YEAR HAVE YOU HAD 5 OR MORE DRINKS IN A DAY: 0
EVER HAD A DRINK FIRST THING IN THE MORNING TO STEADY YOUR NERVES TO GET RID OF A HANGOVER: NO
HAVE PEOPLE ANNOYED YOU BY CRITICIZING YOUR DRINKING: NO
EVER_SMOKED: NEVER
TOTAL SCORE: 0
AVERAGE NUMBER OF DAYS PER WEEK YOU HAVE A DRINK CONTAINING ALCOHOL: 0
ON A TYPICAL DAY WHEN YOU DRINK ALCOHOL HOW MANY DRINKS DO YOU HAVE: 0
DOES PATIENT WANT TO STOP DRINKING: NO
CONSUMPTION TOTAL: NEGATIVE
TOTAL SCORE: 0
TOTAL SCORE: 0

## 2020-02-02 ASSESSMENT — COGNITIVE AND FUNCTIONAL STATUS - GENERAL
DAILY ACTIVITIY SCORE: 11
PERSONAL GROOMING: A LITTLE
DRESSING REGULAR LOWER BODY CLOTHING: TOTAL
TURNING FROM BACK TO SIDE WHILE IN FLAT BAD: A LOT
HELP NEEDED FOR BATHING: TOTAL
CLIMB 3 TO 5 STEPS WITH RAILING: TOTAL
STANDING UP FROM CHAIR USING ARMS: A LOT
MOVING TO AND FROM BED TO CHAIR: A LOT
SUGGESTED CMS G CODE MODIFIER DAILY ACTIVITY: CL
WALKING IN HOSPITAL ROOM: TOTAL
MOBILITY SCORE: 10
MOVING FROM LYING ON BACK TO SITTING ON SIDE OF FLAT BED: A LOT
DRESSING REGULAR UPPER BODY CLOTHING: TOTAL
SUGGESTED CMS G CODE MODIFIER MOBILITY: CL
TOILETING: TOTAL

## 2020-02-02 ASSESSMENT — COPD QUESTIONNAIRES
DO YOU EVER COUGH UP ANY MUCUS OR PHLEGM?: NO/ONLY WITH OCCASIONAL COLDS OR INFECTIONS
IN THE PAST 12 MONTHS DO YOU DO LESS THAN YOU USED TO BECAUSE OF YOUR BREATHING PROBLEMS: DISAGREE/UNSURE
HAVE YOU SMOKED AT LEAST 100 CIGARETTES IN YOUR ENTIRE LIFE: NO/DON'T KNOW
COPD SCREENING SCORE: 2
DURING THE PAST 4 WEEKS HOW MUCH DID YOU FEEL SHORT OF BREATH: NONE/LITTLE OF THE TIME

## 2020-02-02 ASSESSMENT — PATIENT HEALTH QUESTIONNAIRE - PHQ9
1. LITTLE INTEREST OR PLEASURE IN DOING THINGS: NOT AT ALL
SUM OF ALL RESPONSES TO PHQ9 QUESTIONS 1 AND 2: 0
2. FEELING DOWN, DEPRESSED, IRRITABLE, OR HOPELESS: NOT AT ALL

## 2020-02-02 NOTE — ED NOTES
1112:  PIV placed in RH.  Blood drawn and sent to lab.    IVF infusing as ordered.  Pt and family updated on POC including pending tests and chart review by ERP.  Denied questions/concerns/needs at this time.

## 2020-02-02 NOTE — H&P
Hospital Medicine History & Physical Note    Date of Service  2/2/2020    Primary Care Physician  Bolivar Basurto D.O.    Consultants  None    Code Status  Full Code    Chief Complaint  Chief Complaint   Patient presents with   • Weakness       History of Presenting Illness  Ray is a very pleasant 79 y.o. female with a past medical history of HTN, HLD presented to the emergency room on 2/2/2020 for evaluation of generalized fatigue and inability to take care of herself, as well as patient's  is unable to take care of her. Patient said she was in her usual state of health until patient had a distal radius fracture and had an ORIF one week ago.  Patient also says she has considerable pain in her left arm which.  But her from doing anything, patient son says that because of this she does not want to eat because she does not want to go to the bathroom.  What prompted them to come today was that patient was unable to get up from her toilet today.  Chavira denies having any fevers chills, chest pain shortness of breath, diarrhea or dysuria.    Review of Systems  Review of Systems   Constitutional: Positive for malaise/fatigue. Negative for chills and fever.   HENT: Negative for congestion, hearing loss, sore throat and tinnitus.    Eyes: Negative for blurred vision, double vision, photophobia and pain.   Respiratory: Negative for cough, hemoptysis, sputum production, shortness of breath and stridor.    Cardiovascular: Negative for chest pain, palpitations, orthopnea, claudication and PND.   Gastrointestinal: Negative for blood in stool, constipation, heartburn, melena, nausea and vomiting.   Genitourinary: Negative for dysuria, frequency and urgency.   Musculoskeletal: Positive for joint pain and myalgias. Negative for back pain and neck pain.   Neurological: Positive for weakness. Negative for dizziness, tingling, tremors, sensory change, speech change and headaches.   Psychiatric/Behavioral: Negative for depression,  memory loss and suicidal ideas. The patient is not nervous/anxious.    All other systems reviewed and are negative.      Past Medical History  Hypertension  Dyslipidemia    Surgical History  ORIF of distal radius fracture    Family History  Denies significant past family history.    Social History   reports that she has never smoked. She has never used smokeless tobacco. She reports that she does not drink alcohol or use drugs.    Allergies  No Known Allergies    Medications  Prior to Admission medications    Medication Sig Start Date End Date Taking? Authorizing Provider   HYDROcodone-acetaminophen (NORCO) 5-325 MG Tab per tablet Take 1-2 Tabs by mouth every four hours as needed. Indications: Pain   Yes Physician Outpatient   ibuprofen (MOTRIN) 200 MG Tab Take 400 mg by mouth every 6 hours as needed for Mild Pain.   Yes Physician Outpatient   alendronate (FOSAMAX) 70 MG Tab Take 70 mg by mouth every 7 days. On Monday   Yes Physician Outpatient   CALCIUM PO Take 1 Tab by mouth every day.   Yes Physician Outpatient   Cholecalciferol (VITAMIN D3 PO) Take 1 Cap by mouth every day.   Yes Physician Outpatient   multivitamin (THERAGRAN) Tab Take 1 Tab by mouth every day.   Yes Physician Outpatient   Ascorbic Acid (VITAMIN C PO) Take 1 Tab by mouth every day.   Yes Physician Outpatient   IRON PO Take 1 Tab by mouth every day.   Yes Physician Outpatient   lisinopril (PRINIVIL) 40 MG tablet Take 40 mg by mouth every day. 1/14/20   Physician Outpatient   amLODIPine (NORVASC) 5 MG Tab Take 5 mg by mouth 2 Times a Day. 7/30/18   Physician Outpatient   atenolol (TENORMIN) 50 MG Tab Take 50 mg by mouth 2 times a day. 7/30/18   Physician Outpatient   pravastatin (PRAVACHOL) 40 MG tablet Take 40 mg by mouth every day. 7/3/18   Physician Outpatient       Physical Exam  Temp:  [36.8 °C (98.2 °F)] 36.8 °C (98.2 °F)  Pulse:  [81-88] 82  Resp:  [18-23] 18  BP: (170)/(73) 170/73  SpO2:  [93 %-95 %] 93 %  Physical Exam   Constitutional:  She is oriented to person, place, and time. She appears well-developed and well-nourished. No distress.   HENT:   Head: Normocephalic and atraumatic.   Mouth/Throat: No oropharyngeal exudate.   Eyes: Pupils are equal, round, and reactive to light. Conjunctivae are normal. Right eye exhibits no discharge. No scleral icterus.   Neck: Neck supple. No JVD present. No thyromegaly present.   Cardiovascular: Normal rate and intact distal pulses.   No murmur heard.  Pulmonary/Chest: Effort normal and breath sounds normal. No stridor. No respiratory distress. She has no wheezes. She has no rales.   Abdominal: Soft. Bowel sounds are normal. She exhibits no distension. There is no tenderness. There is no rebound.   Musculoskeletal:         General: Tenderness and edema present.      Comments: Left arm in cast, patient able to move her fingers, sensation intact. Mild left hand swelling    Neurological: She is alert and oriented to person, place, and time. No cranial nerve deficit.   Skin: Skin is warm. She is not diaphoretic. No erythema.   Psychiatric: She has a normal mood and affect. Her behavior is normal. Thought content normal.   Nursing note and vitals reviewed.      Laboratory:  Recent Labs     02/02/20  1112   WBC 12.7*   RBC 3.97*   HEMOGLOBIN 11.5*   HEMATOCRIT 37.2   MCV 93.7   MCH 29.0   MCHC 30.9*   RDW 46.4   PLATELETCT 147*   MPV 12.1     Recent Labs     02/02/20  1112   SODIUM 142   POTASSIUM 4.0   CHLORIDE 106   CO2 22   GLUCOSE 115*   BUN 20   CREATININE 0.82   CALCIUM 9.8     Recent Labs     02/02/20  1112   ALTSGPT 10   ASTSGOT 19   ALKPHOSPHAT 81   TBILIRUBIN 0.6   GLUCOSE 115*               Urinalysis:          Imaging:  DX-CHEST-PORTABLE (1 VIEW)   Final Result         1. No acute cardiopulmonary abnormalities are identified.          Assessment/Plan:  I anticipate this patient is appropriate for observation status at this time.    Leukocytosis  Assessment & Plan  No signs of SIRS  Check  UA      Generalized weakness  Assessment & Plan  More generalized fatigue. Patient and  are mostly sedentary.   PT/OT pending  TSH/Free T4 pending    Essential hypertension  Assessment & Plan  Controlled  Resume home dose of atenolol, norvasc and lisinopril      VTE prophylaxis: Prophylaxis: lovenox

## 2020-02-02 NOTE — ED PROVIDER NOTES
ED Provider Note    CHIEF COMPLAINT  Chief Complaint   Patient presents with   • Weakness       HPI  Shama Herrera is a 79 y.o. female who presents to the Emergency Department with a history of a fall on 20 January.  The patient suffered a distal radius fracture, and underwent operative repair with a plate placed a week ago.  She was given oxycodone for the pain and states she does not like taking it because it makes her feel dizzy.  She is continued to have pain in her left arm.  She feels like she is unable to sleep in her bed because of the left arm pain.  She is getting progressively more weak and not wanting to eat.  Her  brought her into the emergency department today because she was unable to stand up from the toilet after going to the bathroom and they are feeling she needs more care than she is getting at this time.  She has not had any fever or any vomiting.  She does admit to poor oral intake and did not have breakfast this morning.  For her pain she has been taking ibuprofen 800 mg when she is uncomfortable      REVIEW OF SYSTEMS  Positive for left arm pain finger swelling, Negative for fall focal weakness fever vomiting.  As above all other systems are negative.    PAST MEDICAL HISTORY  Htn  Dyslipidemia  FAMILY HISTORY  History reviewed. No pertinent family history.     SOCIAL HISTORY  Social History     Tobacco Use   • Smoking status: Never Smoker   • Smokeless tobacco: Never Used   Substance and Sexual Activity   • Alcohol use: No   • Drug use: Never   • Sexual activity: Not on file       SURGICAL HISTORY  patient denies any surgical history    CURRENT MEDICATIONS  Reviewed.  See Encounter Summary.  Include   Current Facility-Administered Medications:   •  NS infusion, , Intravenous, Continuous, Vesta Feliz M.D., 1,000 mL at 02/02/20 1119    Current Outpatient Medications:   •  lisinopril (PRINIVIL) 10 MG Tab, Take 10 mg by mouth every day., Disp: , Rfl:   •  amLODIPine (NORVASC) 5 MG Tab,  "Take 5 mg by mouth every day., Disp: , Rfl:   •  atenolol (TENORMIN) 50 MG Tab, Take 50 mg by mouth every day., Disp: , Rfl:   •  pravastatin (PRAVACHOL) 40 MG tablet, Take 40 mg by mouth every day., Disp: , Rfl:       ALLERGIES  No Known Allergies    PHYSICAL EXAM  VITAL SIGNS: BP (!) 170/73   Pulse 82   Temp 36.8 °C (98.2 °F) (Temporal)   Resp 18   Ht 1.549 m (5' 1\")   Wt 69.3 kg (152 lb 14.2 oz)   SpO2 93%   BMI 28.89 kg/m²   Constitutional:  Weak appearing , able to answer questions  HENT: Nose is normal in appearance, external ears are normal,  moist mucous membranes  Eyes: Anicteric,  pupils are equal round and reactive, there is no conjunctival drainage or pallor   Neck: The trachea is midline, there is no obvious mass or meningeal signs  Cardiovascular: Good perfusion,  regular rate and rhythm without murmurs gallops or rubs  Thorax & Lungs: Respiratory rate and effort are normal. There is normal chest excursion with respiration.  No wheezes rhonchi or rales noted.  Abdomen: Abdomen is normal in appearance, no gross peritoneal signs  normal bowel sounds, no pain with cough  :   No CVA tenderness to palpation  Musculoskeletal: Left arm is in a cast, swollen digits noted  Skin: Visualized skin is warm without rash.  Neurologic:  Cranial nerves II through XII are intact there is no focal abnormality noted.  Psychiatric: Normal mood and mentation    RADIOLOGY/PROCEDURES  Imaging Studies:    DX-CHEST-PORTABLE (1 VIEW)   Final Result         1. No acute cardiopulmonary abnormalities are identified.            Pertinent Labs   Results for orders placed or performed during the hospital encounter of 02/02/20   CBC WITH DIFFERENTIAL   Result Value Ref Range    WBC 12.7 (H) 4.8 - 10.8 K/uL    RBC 3.97 (L) 4.20 - 5.40 M/uL    Hemoglobin 11.5 (L) 12.0 - 16.0 g/dL    Hematocrit 37.2 37.0 - 47.0 %    MCV 93.7 81.4 - 97.8 fL    MCH 29.0 27.0 - 33.0 pg    MCHC 30.9 (L) 33.6 - 35.0 g/dL    RDW 46.4 35.9 - 50.0 fL    " Platelet Count 147 (L) 164 - 446 K/uL    MPV 12.1 9.0 - 12.9 fL    Neutrophils-Polys 83.70 (H) 44.00 - 72.00 %    Lymphocytes 6.70 (L) 22.00 - 41.00 %    Monocytes 8.90 0.00 - 13.40 %    Eosinophils 0.10 0.00 - 6.90 %    Basophils 0.20 0.00 - 1.80 %    Immature Granulocytes 0.40 0.00 - 0.90 %    Nucleated RBC 0.00 /100 WBC    Neutrophils (Absolute) 10.66 (H) 2.00 - 7.15 K/uL    Lymphs (Absolute) 0.85 (L) 1.00 - 4.80 K/uL    Monos (Absolute) 1.13 (H) 0.00 - 0.85 K/uL    Eos (Absolute) 0.01 0.00 - 0.51 K/uL    Baso (Absolute) 0.03 0.00 - 0.12 K/uL    Immature Granulocytes (abs) 0.05 0.00 - 0.11 K/uL    NRBC (Absolute) 0.00 K/uL   Comp Metabolic Panel   Result Value Ref Range    Sodium 142 135 - 145 mmol/L    Potassium 4.0 3.6 - 5.5 mmol/L    Chloride 106 96 - 112 mmol/L    Co2 22 20 - 33 mmol/L    Anion Gap 14.0 (H) 0.0 - 11.9    Glucose 115 (H) 65 - 99 mg/dL    Bun 20 8 - 22 mg/dL    Creatinine 0.82 0.50 - 1.40 mg/dL    Calcium 9.8 8.4 - 10.2 mg/dL    AST(SGOT) 19 12 - 45 U/L    ALT(SGPT) 10 2 - 50 U/L    Alkaline Phosphatase 81 30 - 99 U/L    Total Bilirubin 0.6 0.1 - 1.5 mg/dL    Albumin 3.4 3.2 - 4.9 g/dL    Total Protein 7.3 6.0 - 8.2 g/dL    Globulin 3.9 (H) 1.9 - 3.5 g/dL    A-G Ratio 0.9 g/dL   TROPONIN   Result Value Ref Range    Troponin T 24 (H) 6 - 19 ng/L   ESTIMATED GFR   Result Value Ref Range    GFR If African American >60 >60 mL/min/1.73 m 2    GFR If Non African American >60 >60 mL/min/1.73 m 2       *.      COURSE & MEDICAL DECISION MAKING  Nursing notes and vital signs were reviewed. (See chart for details)  The patients  records were reviewed, history was obtained from the patient and her  and son-in-law;     The patient presents with generalized weakness, unable to get up from the toilet, and the differential diagnosis includes but is not limited to dehydration, poor oral intake secondary to narcotics with associated weakness, difficulty functioning secondary to left arm casting.  There is  no evidence of an acute stroke.  I will screen for infectious etiology or electrolyte abnormality..         ED testing reveals minimally elevated troponin, no evidence of pneumonia, urine is still pending, electrolytes are unremarkable.  She does have some acidosis and has been hydrated here secondary to early concerns for dehydration.  At this time she has persistent weakness and she will be admitted as her  and her are unable to care for her at this time    FINAL IMPRESSION  1.  Generalized weakness  2.  Left distal radius fracture       DISPOSITION  Admit    Electronically signed by: Vesta Feliz M.D., 2/2/2020 10:39 AM

## 2020-02-02 NOTE — ED NOTES
Pt resting quietly at this time.  IVF infusing as ordered.  Pt and family aware of pending admission.

## 2020-02-02 NOTE — ED NOTES
Med rec updated and complete  Allergies reviewed  Interviewed pt with family at bedside with permission from pt.  Pt reports she is not sure the name and strength of her medications.  Called [facility] @ [phone] to verify names and dose of all prescription medications.  Went back asked if these medications were right, pt reports that she takes 2 more vitamins, not sure what they are.  Pt reports no antibiotics in the last 2 weeks

## 2020-02-02 NOTE — ED TRIAGE NOTES
"Presents accompanied by family.  Pt C/O generalized weakness, and overall failure to thrive recurring for a week, after enduring right shoulder surgery.  Chief Complaint   Patient presents with   • Weakness     BP (!) 170/73   Pulse 88   Temp 36.8 °C (98.2 °F) (Temporal)   Resp 20   Ht 1.549 m (5' 1\")   BMI 29.83 kg/m²       "

## 2020-02-03 PROBLEM — S52.509A DISTAL RADIUS FRACTURE: Status: ACTIVE | Noted: 2020-02-03

## 2020-02-03 PROBLEM — A41.9 SEPSIS (HCC): Status: ACTIVE | Noted: 2020-02-03

## 2020-02-03 PROBLEM — N39.0 UTI (URINARY TRACT INFECTION): Status: ACTIVE | Noted: 2020-02-03

## 2020-02-03 PROBLEM — E05.90 HYPERTHYROIDISM: Status: ACTIVE | Noted: 2020-02-03

## 2020-02-03 LAB
ALBUMIN SERPL BCP-MCNC: 3.1 G/DL (ref 3.2–4.9)
ALBUMIN/GLOB SERPL: 1 G/DL
ALP SERPL-CCNC: 81 U/L (ref 30–99)
ALT SERPL-CCNC: 12 U/L (ref 2–50)
ANION GAP SERPL CALC-SCNC: 13 MMOL/L (ref 0–11.9)
AST SERPL-CCNC: 19 U/L (ref 12–45)
BASOPHILS # BLD AUTO: 0.2 % (ref 0–1.8)
BASOPHILS # BLD: 0.03 K/UL (ref 0–0.12)
BILIRUB SERPL-MCNC: 0.5 MG/DL (ref 0.1–1.5)
BUN SERPL-MCNC: 17 MG/DL (ref 8–22)
CALCIUM SERPL-MCNC: 9 MG/DL (ref 8.4–10.2)
CHLORIDE SERPL-SCNC: 107 MMOL/L (ref 96–112)
CO2 SERPL-SCNC: 21 MMOL/L (ref 20–33)
CREAT SERPL-MCNC: 0.79 MG/DL (ref 0.5–1.4)
EOSINOPHIL # BLD AUTO: 0 K/UL (ref 0–0.51)
EOSINOPHIL NFR BLD: 0 % (ref 0–6.9)
ERYTHROCYTE [DISTWIDTH] IN BLOOD BY AUTOMATED COUNT: 46.5 FL (ref 35.9–50)
GLOBULIN SER CALC-MCNC: 3.2 G/DL (ref 1.9–3.5)
GLUCOSE SERPL-MCNC: 132 MG/DL (ref 65–99)
HCT VFR BLD AUTO: 31.8 % (ref 37–47)
HGB BLD-MCNC: 10.1 G/DL (ref 12–16)
IMM GRANULOCYTES # BLD AUTO: 0.07 K/UL (ref 0–0.11)
IMM GRANULOCYTES NFR BLD AUTO: 0.6 % (ref 0–0.9)
LYMPHOCYTES # BLD AUTO: 0.49 K/UL (ref 1–4.8)
LYMPHOCYTES NFR BLD: 4.1 % (ref 22–41)
MCH RBC QN AUTO: 29.2 PG (ref 27–33)
MCHC RBC AUTO-ENTMCNC: 31.8 G/DL (ref 33.6–35)
MCV RBC AUTO: 91.9 FL (ref 81.4–97.8)
MONOCYTES # BLD AUTO: 1.1 K/UL (ref 0–0.85)
MONOCYTES NFR BLD AUTO: 9.2 % (ref 0–13.4)
NEUTROPHILS # BLD AUTO: 10.33 K/UL (ref 2–7.15)
NEUTROPHILS NFR BLD: 85.9 % (ref 44–72)
NRBC # BLD AUTO: 0 K/UL
NRBC BLD-RTO: 0 /100 WBC
PLATELET # BLD AUTO: 216 K/UL (ref 164–446)
PMV BLD AUTO: 11.4 FL (ref 9–12.9)
POTASSIUM SERPL-SCNC: 3.8 MMOL/L (ref 3.6–5.5)
PROT SERPL-MCNC: 6.3 G/DL (ref 6–8.2)
RBC # BLD AUTO: 3.46 M/UL (ref 4.2–5.4)
SODIUM SERPL-SCNC: 141 MMOL/L (ref 135–145)
TROPONIN T SERPL-MCNC: 30 NG/L (ref 6–19)
WBC # BLD AUTO: 12 K/UL (ref 4.8–10.8)

## 2020-02-03 PROCEDURE — 99232 SBSQ HOSP IP/OBS MODERATE 35: CPT | Performed by: HOSPITALIST

## 2020-02-03 PROCEDURE — 97162 PT EVAL MOD COMPLEX 30 MIN: CPT

## 2020-02-03 PROCEDURE — 97165 OT EVAL LOW COMPLEX 30 MIN: CPT

## 2020-02-03 PROCEDURE — 97166 OT EVAL MOD COMPLEX 45 MIN: CPT

## 2020-02-03 PROCEDURE — 700111 HCHG RX REV CODE 636 W/ 250 OVERRIDE (IP): Performed by: HOSPITALIST

## 2020-02-03 PROCEDURE — 85025 COMPLETE CBC W/AUTO DIFF WBC: CPT

## 2020-02-03 PROCEDURE — 700102 HCHG RX REV CODE 250 W/ 637 OVERRIDE(OP): Performed by: HOSPITALIST

## 2020-02-03 PROCEDURE — 96372 THER/PROPH/DIAG INJ SC/IM: CPT

## 2020-02-03 PROCEDURE — A9270 NON-COVERED ITEM OR SERVICE: HCPCS | Performed by: HOSPITALIST

## 2020-02-03 PROCEDURE — 80053 COMPREHEN METABOLIC PANEL: CPT

## 2020-02-03 PROCEDURE — 97535 SELF CARE MNGMENT TRAINING: CPT

## 2020-02-03 PROCEDURE — 36415 COLL VENOUS BLD VENIPUNCTURE: CPT

## 2020-02-03 PROCEDURE — 84484 ASSAY OF TROPONIN QUANT: CPT

## 2020-02-03 PROCEDURE — 770006 HCHG ROOM/CARE - MED/SURG/GYN SEMI*

## 2020-02-03 PROCEDURE — 700105 HCHG RX REV CODE 258: Performed by: HOSPITALIST

## 2020-02-03 RX ORDER — NITROFURANTOIN 25; 75 MG/1; MG/1
100 CAPSULE ORAL 2 TIMES DAILY WITH MEALS
Status: DISPENSED | OUTPATIENT
Start: 2020-02-03 | End: 2020-02-07

## 2020-02-03 RX ADMIN — SODIUM CHLORIDE, POTASSIUM CHLORIDE, SODIUM LACTATE AND CALCIUM CHLORIDE: 600; 310; 30; 20 INJECTION, SOLUTION INTRAVENOUS at 21:46

## 2020-02-03 RX ADMIN — ACETAMINOPHEN 650 MG: 325 TABLET, FILM COATED ORAL at 08:08

## 2020-02-03 RX ADMIN — ENOXAPARIN SODIUM 40 MG: 100 INJECTION SUBCUTANEOUS at 05:57

## 2020-02-03 RX ADMIN — AMLODIPINE BESYLATE 5 MG: 5 TABLET ORAL at 05:56

## 2020-02-03 RX ADMIN — ACETAMINOPHEN 650 MG: 325 TABLET, FILM COATED ORAL at 15:41

## 2020-02-03 RX ADMIN — ATENOLOL 50 MG: 25 TABLET ORAL at 05:56

## 2020-02-03 RX ADMIN — NITROFURANTOIN (MONOHYDRATE/MACROCRYSTALS) 100 MG: 75; 25 CAPSULE ORAL at 17:56

## 2020-02-03 RX ADMIN — LISINOPRIL 10 MG: 5 TABLET ORAL at 05:57

## 2020-02-03 RX ADMIN — ACETAMINOPHEN 650 MG: 325 TABLET, FILM COATED ORAL at 21:46

## 2020-02-03 RX ADMIN — SODIUM CHLORIDE, POTASSIUM CHLORIDE, SODIUM LACTATE AND CALCIUM CHLORIDE: 600; 310; 30; 20 INJECTION, SOLUTION INTRAVENOUS at 09:29

## 2020-02-03 RX ADMIN — SODIUM CHLORIDE, POTASSIUM CHLORIDE, SODIUM LACTATE AND CALCIUM CHLORIDE: 600; 310; 30; 20 INJECTION, SOLUTION INTRAVENOUS at 00:13

## 2020-02-03 RX ADMIN — PRAVASTATIN SODIUM 40 MG: 20 TABLET ORAL at 05:56

## 2020-02-03 ASSESSMENT — ACTIVITIES OF DAILY LIVING (ADL): TOILETING: REQUIRES ASSIST

## 2020-02-03 ASSESSMENT — COGNITIVE AND FUNCTIONAL STATUS - GENERAL
DRESSING REGULAR LOWER BODY CLOTHING: TOTAL
DRESSING REGULAR UPPER BODY CLOTHING: A LOT
SUGGESTED CMS G CODE MODIFIER MOBILITY: CM
SUGGESTED CMS G CODE MODIFIER DAILY ACTIVITY: CL
TOILETING: A LOT
PERSONAL GROOMING: A LITTLE
EATING MEALS: A LITTLE
HELP NEEDED FOR BATHING: TOTAL
WALKING IN HOSPITAL ROOM: TOTAL
DAILY ACTIVITIY SCORE: 12
MOVING FROM LYING ON BACK TO SITTING ON SIDE OF FLAT BED: UNABLE
TURNING FROM BACK TO SIDE WHILE IN FLAT BAD: UNABLE
MOBILITY SCORE: 7
STANDING UP FROM CHAIR USING ARMS: A LOT
CLIMB 3 TO 5 STEPS WITH RAILING: TOTAL
MOVING TO AND FROM BED TO CHAIR: UNABLE

## 2020-02-03 ASSESSMENT — ENCOUNTER SYMPTOMS
FEVER: 0
BACK PAIN: 0
BLURRED VISION: 0
DEPRESSION: 0
CHILLS: 0
EYE PAIN: 0
SHORTNESS OF BREATH: 0
SORE THROAT: 0
PALPITATIONS: 0
VOMITING: 0
INSOMNIA: 0
NECK PAIN: 0
TINGLING: 0
COUGH: 0
HEADACHES: 0
ABDOMINAL PAIN: 0
NAUSEA: 1
DIZZINESS: 0

## 2020-02-03 ASSESSMENT — GAIT ASSESSMENTS: GAIT LEVEL OF ASSIST: UNABLE TO PARTICIPATE

## 2020-02-03 NOTE — ASSESSMENT & PLAN NOTE
This is Sepsis Not present on admission  SIRS criteria identified on my evaluation include: Tachycardia, with heart rate greater than 90 BPM and Leukocyosis, with WBC greater than 12,000  Source is uti  Now resolved

## 2020-02-03 NOTE — ASSESSMENT & PLAN NOTE
ORIF surgical repair on previous admission, patient failed discharge home due to inability to use her arm  Patient could not get off the toilet at home and  was unable to get her up  here she requires assistance getting up from any sitting position

## 2020-02-03 NOTE — CARE PLAN
Problem: Safety  Goal: Will remain free from injury  Outcome: PROGRESSING AS EXPECTED  Goal: Will remain free from falls  Outcome: PROGRESSING AS EXPECTED     Problem: Venous Thromboembolism (VTW)/Deep Vein Thrombosis (DVT) Prevention:  Goal: Patient will participate in Venous Thrombosis (VTE)/Deep Vein Thrombosis (DVT)Prevention Measures  Outcome: PROGRESSING AS EXPECTED     Problem: Mobility  Goal: Risk for activity intolerance will decrease  Outcome: PROGRESSING AS EXPECTED

## 2020-02-03 NOTE — CARE PLAN
Problem: Safety  Goal: Will remain free from injury  Outcome: PROGRESSING AS EXPECTED  Goal: Will remain free from falls  Outcome: PROGRESSING AS EXPECTED     Problem: Infection  Goal: Will remain free from infection  Outcome: PROGRESSING AS EXPECTED     Problem: Venous Thromboembolism (VTW)/Deep Vein Thrombosis (DVT) Prevention:  Goal: Patient will participate in Venous Thrombosis (VTE)/Deep Vein Thrombosis (DVT)Prevention Measures  Outcome: PROGRESSING AS EXPECTED     Problem: Mobility  Goal: Risk for activity intolerance will decrease  Outcome: PROGRESSING SLOWER THAN EXPECTED

## 2020-02-03 NOTE — PROGRESS NOTES
2000 Pt is A&Ox4. Vss. Pt c/o 5/10 pain in back. Positioned for comfort. No n/v. Pt had large, loose BM this evening. Bowel sounds are hypoactive. Results from UA came back. Updated MD. Received orders to start rocephin. Pt has no other needs at this time. Fall precautions in place.

## 2020-02-03 NOTE — THERAPY
"Physical Therapy Evaluation completed.   Bed Mobility:  Supine to Sit: (Up in chair upon arrival)  Transfers: Sit to Stand: Maximal Assist  Gait: Level Of Assist: Unable to Participate        Plan of Care: Will benefit from Physical Therapy 4 times per week  Discharge Recommendations: Equipment: Will Continue to Assess for Equipment Needs. Post-acute therapy Discharge to a transitional care facility for continued skilled therapy services.    Pt is a 79 year old female admitted to the hospital for FTT at home. Pt had fall on 1/20 and was in the ED for L distal radius fracture. Pt underwent closed reduction and was scheduled to follow up with ortho today. Pt reports that since DC , she has struggled with mobility and was only ambulating very short distances at home with assistance from her . Pt seated in chair upon arrival, L LE resting by side and not elevated. Significant swelling and redness to L fingers with limited ROM into PIP and DIP extension. Pt reports pain with efforts to passively extend fingers. Pt also with swelling of the proximal L LE. Front of pt's gown covered in urine, so assisted RN and CNA with mobilizing pt for clean up and to change gown. Maximal assist for sit to stand. Pt with posterior lean in standing and forward flexed posture. Pt able to take 2 steps forward/backwards with mod to max A. Pt requested to return to chair. Pt educated on importance of finger and elbow ROM to prevent contractures. Also assisted pt with positioning L UE to limited swelling. Plan to see pt 4x/week for skilled PT intervention. Pt will require post acute transitional care upon DC given current status    See \"Rehab Therapy-Acute\" Patient Summary Report for complete documentation.     "

## 2020-02-03 NOTE — PROGRESS NOTES
[facility] Medicine Daily Progress Note    Date of Service  2/3/2020    Chief Complaint  79 y.o. female admitted 2/2/2020 with weakness and inability to care for self.     Hospital Course    She was found to have a UTI and related sepsis. She was treated with fluids and antibiotics. She also has a significant history of a distal radium fracture s/p ORIF a week prior to this admission. Her activity level has been poor at home and her  is unable to care for her. She was also found to have hyperthyroidism with a suppressed TSH. She was treated with sepsis protocols , IV fluids and antibiotics.        Interval Problem Update  Feeling improved today. No events over night. I texted dr carmen today as she missed her postop follow up. He says she needs to be seen in the office adn to have SNF arrange for a visit.     Consultants/Specialty  none    Code Status  full    Disposition  snf    Review of Systems  Review of Systems   Constitutional: Positive for malaise/fatigue. Negative for chills and fever.   HENT: Negative for sore throat.    Eyes: Negative for blurred vision and pain.   Respiratory: Negative for cough and shortness of breath.    Cardiovascular: Negative for chest pain and palpitations.   Gastrointestinal: Positive for nausea. Negative for abdominal pain and vomiting.   Genitourinary: Negative for dysuria and urgency.   Musculoskeletal: Positive for joint pain. Negative for back pain and neck pain.   Skin: Negative for itching and rash.   Neurological: Negative for dizziness, tingling and headaches.   Psychiatric/Behavioral: Negative for depression. The patient does not have insomnia.    All other systems reviewed and are negative.       Physical Exam  Temp:  [36.6 °C (97.9 °F)-36.9 °C (98.5 °F)] 36.6 °C (97.9 °F)  Pulse:  [81-97] 82  Resp:  [16-23] 18  BP: (144-177)/(46-73) 144/68  SpO2:  [92 %-95 %] 93 %    Physical Exam  Vitals signs and nursing note reviewed.   Constitutional:       General: She is not  in acute distress.     Appearance: She is well-developed. She is not diaphoretic.   HENT:      Right Ear: External ear normal.      Left Ear: External ear normal.      Nose: Nose normal.   Eyes:      General:         Right eye: No discharge.         Left eye: No discharge.      Conjunctiva/sclera: Conjunctivae normal.   Neck:      Vascular: No JVD.   Cardiovascular:      Rate and Rhythm: Regular rhythm.      Heart sounds: Normal heart sounds. No murmur.   Pulmonary:      Effort: Pulmonary effort is normal. No respiratory distress.      Breath sounds: Normal breath sounds. No stridor. No wheezing or rales.   Abdominal:      General: Bowel sounds are normal. There is no distension.      Palpations: Abdomen is soft.      Tenderness: There is no tenderness.   Musculoskeletal:         General: No tenderness.      Comments: Left arm in sling. Moderate swelling.    Skin:     General: Skin is warm and dry.      Findings: No erythema.   Neurological:      Mental Status: She is alert and oriented to person, place, and time.   Psychiatric:         Behavior: Behavior normal.         Fluids    Intake/Output Summary (Last 24 hours) at 2/3/2020 0814  Last data filed at 2/3/2020 0400  Gross per 24 hour   Intake 1380 ml   Output 800 ml   Net 580 ml       Laboratory  Recent Labs     02/02/20  1112 02/03/20  0019   WBC 12.7* 12.0*   RBC 3.97* 3.46*   HEMOGLOBIN 11.5* 10.1*   HEMATOCRIT 37.2 31.8*   MCV 93.7 91.9   MCH 29.0 29.2   MCHC 30.9* 31.8*   RDW 46.4 46.5   PLATELETCT 147* 216   MPV 12.1 11.4     Recent Labs     02/02/20  1112 02/03/20  0019   SODIUM 142 141   POTASSIUM 4.0 3.8   CHLORIDE 106 107   CO2 22 21   GLUCOSE 115* 132*   BUN 20 17   CREATININE 0.82 0.79   CALCIUM 9.8 9.0                   Imaging  DX-CHEST-PORTABLE (1 VIEW)   Final Result         1. No acute cardiopulmonary abnormalities are identified.           Assessment/Plan  Hyperthyroidism  Assessment & Plan  Tsh supressed. Will need outpatient workup. Free t4 is  normal.     Distal radius fracture  Assessment & Plan  S/p orif a week ago. Leading to even less mobility.   pain control.   Pt/ot consultations and possible placement.     UTI (urinary tract infection)  Assessment & Plan  Continue antibiotics. Culture pending.     Sepsis (HCC)  Assessment & Plan  This is Sepsis Not present on admission  SIRS criteria identified on my evaluation include: Tachycardia, with heart rate greater than 90 BPM and Leukocyosis, with WBC greater than 12,000  Source is uti  Sepsis protocol initiated  Fluid resuscitation ordered per protocol  IV antibiotics as appropriate for source of sepsis  While organ dysfunction may be noted elsewhere in this problem list or in the chart, degree of organ dysfunction does not meet CMS criteria for severe sepsis  Patient developed this shortly after admission with a heart rate of over 90 plus an admission leukocytosis over 12.          Generalized weakness  Assessment & Plan  More generalized fatigue. Patient and  are mostly sedentary.   PT/OT pending  Possible placement need.     Essential hypertension  Assessment & Plan  Controlled  Resume home dose of atenolol, norvasc and lisinopril       VTE prophylaxis: scd

## 2020-02-03 NOTE — PROGRESS NOTES
Patient arrived from ER. POC discussed. Patient has splint to left wrist, with fingers swollen. Patient A/O times 4. She reports feeling weak and unable to stand or turn on her own. Patient VSS. She reports she fell 2 weeks ago and has declined physically since.   Urine specimen obtained and sent to lab. BP medications given with parameters WNL.

## 2020-02-03 NOTE — PROGRESS NOTES
Report received from NOC RN. Patient sitting up in chair. Patient left arm continues to be swollen and warm. Pt encouraged to wiggle fingers. Arm elevated on pillows. Pt had ORIF of left wrist on 1/20/20. Her follow up appointment with the [facility] was scheduled for today per patient.  Able to move fingers but painful. Patient has wrap to left forearm CDI. Patient A/O times 4. VSS. She does have difficulty with mobilization and needs assist of 2 to stand and pivot. Pt A/O times 4. Will continue to monitor.

## 2020-02-03 NOTE — THERAPY
"Occupational Therapy Evaluation completed.   Functional Status:  80 yo female admit with FTT after recent wrist fracture to LUE that was reduced and splinted in ER and then pt ended up getting ORIF at the [facility] surgery center.  Per notes, pt was in so much pain at home that she self limited her mobility and her oral intake so she wouldn't have to go the bathroom as much.  BIB  due to inability to get her off the toilet.  Currently pt seated up in chair, LUE in sling (recently placed by RN) and elevated on pillows.  Significant edema in L hand/fingers and in proximal LUE.  Pt able to wiggle fingers and extended from DIP down slightly, but not able to actively extend PIP's.  Pt initially refused to let OT touch fingers 2/2 pain, but by end of session when OT attempted PROM, fingers tight in flexion, and unable to fully extend.  Pt given yellow slo-foam block for gentle hand exercises.  Pt missed her Ortho followup appt, WILL BENEFIT FROM ORTHO FOLLOW UP WHILE IN HOUSE to make sure she is progressing as expected and there is no cause for concern with her edema and lack of finger extension.  With regards to mobility and ADL's, pt required encouragement to get up from chair, required modA x2 people for sit to stand and modA x2 to walk with Hand held assist forward 6-7' and back 3 times.  MaxA dressing, using Purewick for toileting.  Seated back up in chair at end of session.  Currently pt is significantly weak, and not safe to be home with spouse at this time.  Pt will benefit from further inpt post acute therapy SNF/Rehab prior to home.  OT will follow while in house.  Plan of Care: Will benefit from Occupational Therapy 3 times per week  Discharge Recommendations:  Equipment: Will Continue to Assess for Equipment Needs. Post-acute therapy Discharge to a transitional care facility for continued skilled therapy services.    See \"Rehab Therapy-Acute\" Patient Summary Report for complete documentation.    "

## 2020-02-04 LAB
ALBUMIN SERPL BCP-MCNC: 3.2 G/DL (ref 3.2–4.9)
ALBUMIN/GLOB SERPL: 0.9 G/DL
ALP SERPL-CCNC: 82 U/L (ref 30–99)
ALT SERPL-CCNC: 15 U/L (ref 2–50)
ANION GAP SERPL CALC-SCNC: 13 MMOL/L (ref 0–11.9)
AST SERPL-CCNC: 20 U/L (ref 12–45)
BASOPHILS # BLD AUTO: 0.5 % (ref 0–1.8)
BASOPHILS # BLD: 0.04 K/UL (ref 0–0.12)
BILIRUB SERPL-MCNC: 0.3 MG/DL (ref 0.1–1.5)
BUN SERPL-MCNC: 15 MG/DL (ref 8–22)
CALCIUM SERPL-MCNC: 9 MG/DL (ref 8.4–10.2)
CHLORIDE SERPL-SCNC: 105 MMOL/L (ref 96–112)
CO2 SERPL-SCNC: 23 MMOL/L (ref 20–33)
CREAT SERPL-MCNC: 0.69 MG/DL (ref 0.5–1.4)
EOSINOPHIL # BLD AUTO: 0.23 K/UL (ref 0–0.51)
EOSINOPHIL NFR BLD: 2.9 % (ref 0–6.9)
ERYTHROCYTE [DISTWIDTH] IN BLOOD BY AUTOMATED COUNT: 43.9 FL (ref 35.9–50)
GLOBULIN SER CALC-MCNC: 3.6 G/DL (ref 1.9–3.5)
GLUCOSE SERPL-MCNC: 111 MG/DL (ref 65–99)
HCT VFR BLD AUTO: 32.6 % (ref 37–47)
HGB BLD-MCNC: 10.4 G/DL (ref 12–16)
IMM GRANULOCYTES # BLD AUTO: 0.03 K/UL (ref 0–0.11)
IMM GRANULOCYTES NFR BLD AUTO: 0.4 % (ref 0–0.9)
LYMPHOCYTES # BLD AUTO: 0.97 K/UL (ref 1–4.8)
LYMPHOCYTES NFR BLD: 12.2 % (ref 22–41)
MCH RBC QN AUTO: 29 PG (ref 27–33)
MCHC RBC AUTO-ENTMCNC: 31.9 G/DL (ref 33.6–35)
MCV RBC AUTO: 90.8 FL (ref 81.4–97.8)
MONOCYTES # BLD AUTO: 0.68 K/UL (ref 0–0.85)
MONOCYTES NFR BLD AUTO: 8.6 % (ref 0–13.4)
NEUTROPHILS # BLD AUTO: 5.99 K/UL (ref 2–7.15)
NEUTROPHILS NFR BLD: 75.4 % (ref 44–72)
NRBC # BLD AUTO: 0 K/UL
NRBC BLD-RTO: 0 /100 WBC
PLATELET # BLD AUTO: 231 K/UL (ref 164–446)
PMV BLD AUTO: 11.8 FL (ref 9–12.9)
POTASSIUM SERPL-SCNC: 3.7 MMOL/L (ref 3.6–5.5)
PROT SERPL-MCNC: 6.8 G/DL (ref 6–8.2)
RBC # BLD AUTO: 3.59 M/UL (ref 4.2–5.4)
SODIUM SERPL-SCNC: 141 MMOL/L (ref 135–145)
WBC # BLD AUTO: 7.9 K/UL (ref 4.8–10.8)

## 2020-02-04 PROCEDURE — 85025 COMPLETE CBC W/AUTO DIFF WBC: CPT

## 2020-02-04 PROCEDURE — 700102 HCHG RX REV CODE 250 W/ 637 OVERRIDE(OP): Performed by: HOSPITALIST

## 2020-02-04 PROCEDURE — A9270 NON-COVERED ITEM OR SERVICE: HCPCS | Performed by: HOSPITALIST

## 2020-02-04 PROCEDURE — 97116 GAIT TRAINING THERAPY: CPT

## 2020-02-04 PROCEDURE — 36415 COLL VENOUS BLD VENIPUNCTURE: CPT

## 2020-02-04 PROCEDURE — 700111 HCHG RX REV CODE 636 W/ 250 OVERRIDE (IP): Performed by: HOSPITALIST

## 2020-02-04 PROCEDURE — 99232 SBSQ HOSP IP/OBS MODERATE 35: CPT | Performed by: INTERNAL MEDICINE

## 2020-02-04 PROCEDURE — 770006 HCHG ROOM/CARE - MED/SURG/GYN SEMI*

## 2020-02-04 PROCEDURE — 80053 COMPREHEN METABOLIC PANEL: CPT

## 2020-02-04 PROCEDURE — 97530 THERAPEUTIC ACTIVITIES: CPT

## 2020-02-04 RX ADMIN — PRAVASTATIN SODIUM 40 MG: 20 TABLET ORAL at 05:05

## 2020-02-04 RX ADMIN — ACETAMINOPHEN 650 MG: 325 TABLET, FILM COATED ORAL at 18:45

## 2020-02-04 RX ADMIN — ENOXAPARIN SODIUM 40 MG: 100 INJECTION SUBCUTANEOUS at 05:06

## 2020-02-04 RX ADMIN — LISINOPRIL 10 MG: 5 TABLET ORAL at 05:04

## 2020-02-04 RX ADMIN — NITROFURANTOIN (MONOHYDRATE/MACROCRYSTALS) 100 MG: 75; 25 CAPSULE ORAL at 18:44

## 2020-02-04 RX ADMIN — NITROFURANTOIN (MONOHYDRATE/MACROCRYSTALS) 100 MG: 75; 25 CAPSULE ORAL at 08:18

## 2020-02-04 RX ADMIN — AMLODIPINE BESYLATE 5 MG: 5 TABLET ORAL at 05:04

## 2020-02-04 RX ADMIN — ATENOLOL 50 MG: 25 TABLET ORAL at 05:05

## 2020-02-04 RX ADMIN — ACETAMINOPHEN 650 MG: 325 TABLET, FILM COATED ORAL at 08:22

## 2020-02-04 ASSESSMENT — GAIT ASSESSMENTS
DISTANCE (FEET): 85
GAIT LEVEL OF ASSIST: MINIMAL ASSIST
ASSISTIVE DEVICE: PLATFORM WALKER
DEVIATION: STEP TO;BRADYKINETIC

## 2020-02-04 ASSESSMENT — ENCOUNTER SYMPTOMS
SORE THROAT: 0
MYALGIAS: 0
CHILLS: 0
TINGLING: 0
BLURRED VISION: 0
FEVER: 0
WEAKNESS: 1
WHEEZING: 0
DIARRHEA: 0
DIAPHORESIS: 0
NECK PAIN: 0
SHORTNESS OF BREATH: 0
INSOMNIA: 0
DEPRESSION: 0
VOMITING: 0
TREMORS: 0
EYE PAIN: 0
NAUSEA: 0
CONSTIPATION: 0
HEADACHES: 0

## 2020-02-04 ASSESSMENT — COGNITIVE AND FUNCTIONAL STATUS - GENERAL
SUGGESTED CMS G CODE MODIFIER MOBILITY: CL
TURNING FROM BACK TO SIDE WHILE IN FLAT BAD: A LOT
STANDING UP FROM CHAIR USING ARMS: A LITTLE
MOBILITY SCORE: 14
CLIMB 3 TO 5 STEPS WITH RAILING: A LOT
MOVING FROM LYING ON BACK TO SITTING ON SIDE OF FLAT BED: A LOT
WALKING IN HOSPITAL ROOM: A LITTLE
MOVING TO AND FROM BED TO CHAIR: A LOT

## 2020-02-04 NOTE — DISCHARGE PLANNING
Dr Johnson spoke to pt at bedside regarding DC plan. Pt is agreeable to go to SNF. Pt stated she would like to go to [facility]. SNF choice form completed and faxed to [facility].

## 2020-02-04 NOTE — PROGRESS NOTES
Hospital Medicine Daily Progress Note    Date of Service  2/4/2020    Chief Complaint  79 y.o. female admitted 2/2/2020 with weakness and inability to care for self.     Hospital Course    She was found to have a UTI and related sepsis. She was treated with fluids and antibiotics. She also has a significant history of a distal radium fracture s/p ORIF a week prior to this admission. Her activity level has been poor at home and her  is unable to care for her. She was also found to have hyperthyroidism with a suppressed TSH but normal T4 level. She was treated with sepsis protocols , IV fluids and antibiotics.        Interval Problem Update  The patient has difficulty walking due to leg weakness and inability to use her left arm.    Consultants/Specialty  None  Orthopedic surgery Dr. Mcpherson    Code Status  full    Disposition  snf    Review of Systems  Review of Systems   Constitutional: Positive for malaise/fatigue. Negative for chills, diaphoresis and fever.   HENT: Negative for congestion and sore throat.    Eyes: Negative for blurred vision and pain.   Respiratory: Negative for shortness of breath and wheezing.    Cardiovascular: Negative for chest pain and leg swelling.   Gastrointestinal: Negative for constipation, diarrhea, nausea and vomiting.   Genitourinary: Negative for dysuria, frequency and urgency.   Musculoskeletal: Negative for joint pain, myalgias and neck pain.        Feet tendons feel tight   Skin: Negative for itching.   Neurological: Positive for weakness. Negative for tingling, tremors and headaches.   Psychiatric/Behavioral: Negative for depression. The patient does not have insomnia.         Physical Exam  Temp:  [36.6 °C (97.9 °F)-37 °C (98.6 °F)] 37 °C (98.6 °F)  Pulse:  [62-64] 62  Resp:  [18] 18  BP: (122-176)/(46-89) 146/50  SpO2:  [94 %-98 %] 94 %    Physical Exam  Vitals signs and nursing note reviewed.   Constitutional:       General: She is not in acute distress.     Appearance:  She is well-developed. She is not ill-appearing or diaphoretic.   HENT:      Nose: Nose normal. No rhinorrhea.      Mouth/Throat:      Mouth: Mucous membranes are moist.      Pharynx: No oropharyngeal exudate.   Eyes:      General: No scleral icterus.     Conjunctiva/sclera: Conjunctivae normal.   Neck:      Vascular: No JVD.   Cardiovascular:      Rate and Rhythm: Regular rhythm.      Heart sounds: Normal heart sounds. No murmur.   Pulmonary:      Effort: Pulmonary effort is normal. No respiratory distress.      Breath sounds: Normal breath sounds. No stridor. No wheezing or rales.   Abdominal:      Palpations: Abdomen is soft. There is no mass.      Tenderness: There is no tenderness.   Musculoskeletal:         General: No tenderness.      Comments: Left arm in sling with impaired mobility   Skin:     General: Skin is warm and dry.      Findings: No erythema.   Neurological:      Mental Status: She is alert and oriented to person, place, and time.      Motor: Weakness present.   Psychiatric:         Mood and Affect: Mood normal.         Behavior: Behavior normal.         Fluids    Intake/Output Summary (Last 24 hours) at 2/4/2020 1509  Last data filed at 2/4/2020 1339  Gross per 24 hour   Intake 700 ml   Output 900 ml   Net -200 ml       Laboratory  Recent Labs     02/02/20  1112 02/03/20  0019 02/04/20  0531   WBC 12.7* 12.0* 7.9   RBC 3.97* 3.46* 3.59*   HEMOGLOBIN 11.5* 10.1* 10.4*   HEMATOCRIT 37.2 31.8* 32.6*   MCV 93.7 91.9 90.8   MCH 29.0 29.2 29.0   MCHC 30.9* 31.8* 31.9*   RDW 46.4 46.5 43.9   PLATELETCT 147* 216 231   MPV 12.1 11.4 11.8     Recent Labs     02/02/20  1112 02/03/20  0019 02/04/20  0531   SODIUM 142 141 141   POTASSIUM 4.0 3.8 3.7   CHLORIDE 106 107 105   CO2 22 21 23   GLUCOSE 115* 132* 111*   BUN 20 17 15   CREATININE 0.82 0.79 0.69   CALCIUM 9.8 9.0 9.0                   Imaging  DX-CHEST-PORTABLE (1 VIEW)   Final Result         1. No acute cardiopulmonary abnormalities are identified.            Assessment/Plan  Hyperthyroidism  Assessment & Plan  Tsh supressed. Will need outpatient workup. Free t4 is normal. No need to treat acutely    Distal radius fracture  Assessment & Plan  S/p orif a week ago. Leading to even less mobility.   pain controlled with tylenol   Pt/ot evaluations done, SNF request placed    UTI (urinary tract infection)  Assessment & Plan  Continue macrobid, urine culture is pending    Sepsis (HCC)  Assessment & Plan  This is Sepsis Not present on admission  SIRS criteria identified on my evaluation include: Tachycardia, with heart rate greater than 90 BPM and Leukocyosis, with WBC greater than 12,000  Source is uti  Sepsis protocol initiated  Fluid resuscitation ordered per protocol  IV antibiotics as appropriate for source of sepsis  While organ dysfunction may be noted elsewhere in this problem list or in the chart, degree of organ dysfunction does not meet CMS criteria for severe sepsis  Patient developed this shortly after admission with a heart rate of over 90 plus an admission leukocytosis over 12.    Now resolved        Generalized weakness  Assessment & Plan  More generalized fatigue. Patient and  are mostly sedentary.   SNF for acute rehab needed    Essential hypertension  Assessment & Plan  Resume home dose of atenolol, norvasc and lisinopril       VTE prophylaxis: scd

## 2020-02-04 NOTE — CARE PLAN
Problem: Infection  Goal: Will remain free from infection  Outcome: PROGRESSING AS EXPECTED  Patient taking oral antibiotics and taking adequate fluids. Monitoring I/O.      Problem: Venous Thromboembolism (VTW)/Deep Vein Thrombosis (DVT) Prevention:  Goal: Patient will participate in Venous Thrombosis (VTE)/Deep Vein Thrombosis (DVT)Prevention Measures  Outcome: PROGRESSING AS EXPECTED  Sequentials on and functioning     Problem: Mobility  Goal: Risk for activity intolerance will decrease  Outcome: PROGRESSING SLOWER THAN EXPECTED  Patient has difficulty standing and walking. PT/OT consulted for increased activity and strengthening.

## 2020-02-04 NOTE — DISCHARGE PLANNING
Received Choice form at [phone]  Agency/Facility Name: [facility]  Referral sent per Choice form @ [phone]

## 2020-02-04 NOTE — CARE PLAN
Problem: Safety  Goal: Will remain free from injury  Outcome: PROGRESSING AS EXPECTED  Note:   Pt educated to call for assistance prior to ambulating.      Problem: Pain Management  Goal: Pain level will decrease to patient's comfort goal  Outcome: PROGRESSING AS EXPECTED  Flowsheets (Taken 2/3/2020 4870)  Non Verbal Scale : Calm; Sleeping; Unlabored Breathing  Note:   Pt educated to call for pain medication when needed.

## 2020-02-05 PROCEDURE — 97110 THERAPEUTIC EXERCISES: CPT

## 2020-02-05 PROCEDURE — A9270 NON-COVERED ITEM OR SERVICE: HCPCS | Performed by: HOSPITALIST

## 2020-02-05 PROCEDURE — 97530 THERAPEUTIC ACTIVITIES: CPT

## 2020-02-05 PROCEDURE — 99232 SBSQ HOSP IP/OBS MODERATE 35: CPT | Performed by: INTERNAL MEDICINE

## 2020-02-05 PROCEDURE — 700102 HCHG RX REV CODE 250 W/ 637 OVERRIDE(OP): Performed by: INTERNAL MEDICINE

## 2020-02-05 PROCEDURE — A9270 NON-COVERED ITEM OR SERVICE: HCPCS | Performed by: INTERNAL MEDICINE

## 2020-02-05 PROCEDURE — 97116 GAIT TRAINING THERAPY: CPT

## 2020-02-05 PROCEDURE — 700102 HCHG RX REV CODE 250 W/ 637 OVERRIDE(OP): Performed by: HOSPITALIST

## 2020-02-05 PROCEDURE — 700111 HCHG RX REV CODE 636 W/ 250 OVERRIDE (IP): Performed by: HOSPITALIST

## 2020-02-05 PROCEDURE — 97535 SELF CARE MNGMENT TRAINING: CPT

## 2020-02-05 PROCEDURE — 770006 HCHG ROOM/CARE - MED/SURG/GYN SEMI*

## 2020-02-05 RX ORDER — NITROFURANTOIN 25; 75 MG/1; MG/1
100 CAPSULE ORAL ONCE
Status: COMPLETED | OUTPATIENT
Start: 2020-02-05 | End: 2020-02-05

## 2020-02-05 RX ADMIN — NITROFURANTOIN (MONOHYDRATE/MACROCRYSTALS) 100 MG: 75; 25 CAPSULE ORAL at 08:13

## 2020-02-05 RX ADMIN — AMLODIPINE BESYLATE 5 MG: 5 TABLET ORAL at 05:54

## 2020-02-05 RX ADMIN — LISINOPRIL 10 MG: 5 TABLET ORAL at 05:53

## 2020-02-05 RX ADMIN — ATENOLOL 50 MG: 25 TABLET ORAL at 05:54

## 2020-02-05 RX ADMIN — NITROFURANTOIN (MONOHYDRATE/MACROCRYSTALS) 100 MG: 75; 25 CAPSULE ORAL at 17:29

## 2020-02-05 RX ADMIN — PRAVASTATIN SODIUM 40 MG: 20 TABLET ORAL at 05:54

## 2020-02-05 RX ADMIN — ENOXAPARIN SODIUM 40 MG: 100 INJECTION SUBCUTANEOUS at 05:54

## 2020-02-05 ASSESSMENT — GAIT ASSESSMENTS
DEVIATION: BRADYKINETIC;ANTALGIC
DISTANCE (FEET): 100
ASSISTIVE DEVICE: PLATFORM WALKER
GAIT LEVEL OF ASSIST: MINIMAL ASSIST

## 2020-02-05 ASSESSMENT — ENCOUNTER SYMPTOMS
MYALGIAS: 0
SORE THROAT: 0
DIARRHEA: 0
DEPRESSION: 0
TREMORS: 0
FEVER: 0
CONSTIPATION: 0
DIAPHORESIS: 0
SHORTNESS OF BREATH: 0
NAUSEA: 0
EYE PAIN: 0
BLURRED VISION: 0
DIZZINESS: 0
COUGH: 0
HEADACHES: 0
INSOMNIA: 0
WEAKNESS: 1
HEARTBURN: 0

## 2020-02-05 ASSESSMENT — COGNITIVE AND FUNCTIONAL STATUS - GENERAL
MOVING FROM LYING ON BACK TO SITTING ON SIDE OF FLAT BED: UNABLE
SUGGESTED CMS G CODE MODIFIER MOBILITY: CL
WALKING IN HOSPITAL ROOM: A LITTLE
HELP NEEDED FOR BATHING: A LOT
SUGGESTED CMS G CODE MODIFIER DAILY ACTIVITY: CK
DRESSING REGULAR UPPER BODY CLOTHING: A LITTLE
TOILETING: A LITTLE
CLIMB 3 TO 5 STEPS WITH RAILING: A LOT
EATING MEALS: A LITTLE
MOVING TO AND FROM BED TO CHAIR: A LOT
DRESSING REGULAR LOWER BODY CLOTHING: A LOT
DAILY ACTIVITIY SCORE: 16
TURNING FROM BACK TO SIDE WHILE IN FLAT BAD: A LOT
MOBILITY SCORE: 13
PERSONAL GROOMING: A LITTLE
STANDING UP FROM CHAIR USING ARMS: A LITTLE

## 2020-02-05 NOTE — THERAPY
"Physical Therapy Treatment completed.   Bed Mobility:  Supine to Sit: ([facility] pre and post)  Transfers: Sit to Stand: Moderate Assist  Gait: Level Of Assist: Minimal Assist with Platform Walker       Plan of Care: Will benefit from Physical Therapy 4 times per week  Discharge Recommendations: Equipment: Will Continue to Assess for Equipment Needs.Recommend post-acute placement for continued physical therapy services prior to discharge home.       See \"Rehab Therapy-Acute\" Patient Summary Report for complete documentation.     Pt is progressing well with functional mobility and able to demosntrate with improved standing balance, ambulation, and activity tolerance. Pt was initally fearful and tearful upon txt session due to fear of falling. Pt initally was able to standing with yunior walker use and take a few short steps with Min to Mod A. Pt was able to improve in confidence and was able to participate in longer distance ambulation once cleared for platform WB tolerance from orthopedic surgeon. Pt was able to ambulate about 85ft with PFWW. Pt will continue to benefit from skilled PT while in house, with recommendation for post acute thearpy prior to d/c home.   "

## 2020-02-05 NOTE — THERAPY
"Occupational Therapy Treatment completed with focus on ADLs, ADL transfers, patient education and upper extremity function.  Functional Status:  Pt agreeable to therapy today- seated up in chair.  Nate sit to stand up to platform walker, needed assist to place LUE on armrest.  Nate to walk to bathroom, Nate on and off toilet- pt was able to do own hygiene.  Nate to stand at sink to wash hand, and pt tolerated short walk in jackson with platform FWW.  Pt returned to chair and worked on gentle prom to fingers on L hand.  Pt still lacking extension, but is moving fingers more and slightly less hypersensitive.  Pt educated on how to do her own gentle PROM at her own pace.  Pt has made significant progress over the last couple days.  She was indep with mobility before she fell and broke wrist, and would like to return to that before home.  Pt would greatly benefit from further inpt post acute therapy prior to home.  OT will follow while in house.   Plan of Care: Will benefit from Occupational Therapy 3 times per week  Discharge Recommendations:  Equipment Will Continue to Assess for Equipment Needs. Post-acute therapy Discharge to a transitional care facility for continued skilled therapy services.    See \"Rehab Therapy-Acute\" Patient Summary Report for complete documentation.   "

## 2020-02-05 NOTE — THERAPY
"Physical Therapy Treatment completed.   Bed Mobility:  Supine to Sit: (Up in chair pre and post session)  Transfers: Sit to Stand: Minimal Assist  Gait: Level Of Assist: Minimal Assist with Front-Wheel Walker       Plan of Care: Will benefit from Physical Therapy 4 times per week  Discharge Recommendations: Equipment: Will Continue to Assess for Equipment Needs. Post-acute therapy Discharge to a transitional care facility for continued skilled therapy services.    Pt's mobility continues to improve. Today pt able to complete sit to stand with platform walker with minimal assist. Verbal cues required for safety during transfer as pt trying to rest L forearm on platform during transition, placing shoulder in awkward position. Pt ambulated in hallway with platform walker, minimal assist for safety. Pt reports \"my legs feel strong\" however, states pain in B heels due to baseline plantar fasciitis. Pt with short step/stride length with antalgic gait but overall steady. Pt encouraged to ambulate to restroom throughout the day vs using Purewick to increase activity tolerance. Noticed decreased swelling in fingers today, improved AROM and less swelling of the proximal L UE. Pt would benefit from ongoing PT intervention while in the acute care setting. Recommend post-acute placement for continued physical therapy services prior to discharge home.         See \"Rehab Therapy-Acute\" Patient Summary Report for complete documentation.       "

## 2020-02-05 NOTE — CARE PLAN
Problem: Safety  Goal: Will remain free from injury  Outcome: PROGRESSING AS EXPECTED  Note:   Pt educated to call for assistance prior to ambulating.      Problem: Pain Management  Goal: Pain level will decrease to patient's comfort goal  Outcome: PROGRESSING AS EXPECTED  Flowsheets (Taken 2/4/2020 2908)  Non Verbal Scale : Calm; Sleeping; Unlabored Breathing

## 2020-02-05 NOTE — PROGRESS NOTES
Hospital Medicine Daily Progress Note    Date of Service  2/5/2020    Chief Complaint  79 y.o. female admitted 2/2/2020 with weakness and inability to care for self.     Hospital Course    She was found to have a UTI and related sepsis. She was treated with fluids and antibiotics. She also has a significant history of a distal radium fracture s/p ORIF a week prior to this admission. Her activity level has been poor at home and her  is unable to care for her. She was also found to have hyperthyroidism with a suppressed TSH but normal T4 level. She was treated with sepsis protocols , IV fluids and antibiotics.        Interval Problem Update  The patient did walk today but requires more assistance than is safe for home discharge    Consultants/Specialty  None  Orthopedic surgery Dr. Mcpherson    Code Status  full    Disposition  snf    Review of Systems  Review of Systems   Constitutional: Positive for malaise/fatigue. Negative for diaphoresis and fever.   HENT: Negative for congestion and sore throat.    Eyes: Negative for blurred vision and pain.   Respiratory: Negative for cough and shortness of breath.    Cardiovascular: Negative for chest pain and leg swelling.   Gastrointestinal: Negative for constipation, diarrhea, heartburn and nausea.   Genitourinary: Negative for dysuria, frequency and urgency.   Musculoskeletal: Negative for joint pain and myalgias.        Left arm less swollen   Skin: Negative for itching.   Neurological: Positive for weakness. Negative for dizziness, tremors and headaches.   Psychiatric/Behavioral: Negative for depression. The patient does not have insomnia.         Physical Exam  Temp:  [36.3 °C (97.3 °F)-37 °C (98.6 °F)] 36.3 °C (97.3 °F)  Pulse:  [59-64] 60  Resp:  [18] 18  BP: (137-176)/(36-80) 146/80  SpO2:  [94 %-99 %] 99 %    Physical Exam  Vitals signs and nursing note reviewed.   Constitutional:       General: She is not in acute distress.     Appearance: She is  well-developed. She is not ill-appearing or diaphoretic.   HENT:      Nose: No congestion or rhinorrhea.      Mouth/Throat:      Mouth: Mucous membranes are moist.      Pharynx: No oropharyngeal exudate or posterior oropharyngeal erythema.   Eyes:      General: No scleral icterus.     Conjunctiva/sclera: Conjunctivae normal.   Neck:      Vascular: No JVD.   Cardiovascular:      Rate and Rhythm: Regular rhythm.      Heart sounds: Normal heart sounds. No murmur.   Pulmonary:      Effort: Pulmonary effort is normal. No respiratory distress.      Breath sounds: Normal breath sounds. No stridor. No wheezing or rales.   Abdominal:      General: There is no distension.      Palpations: Abdomen is soft. There is no mass.      Tenderness: There is no tenderness.   Musculoskeletal:         General: No tenderness.      Comments: Left arm in sling with impaired mobility   Skin:     General: Skin is warm and dry.      Findings: No erythema.   Neurological:      Mental Status: She is alert and oriented to person, place, and time.      Motor: Weakness present.      Coordination: Coordination abnormal.   Psychiatric:         Mood and Affect: Mood normal.         Behavior: Behavior normal.         Fluids    Intake/Output Summary (Last 24 hours) at 2/5/2020 1427  Last data filed at 2/5/2020 1300  Gross per 24 hour   Intake 940 ml   Output --   Net 940 ml       Laboratory  Recent Labs     02/03/20  0019 02/04/20  0531   WBC 12.0* 7.9   RBC 3.46* 3.59*   HEMOGLOBIN 10.1* 10.4*   HEMATOCRIT 31.8* 32.6*   MCV 91.9 90.8   MCH 29.2 29.0   MCHC 31.8* 31.9*   RDW 46.5 43.9   PLATELETCT 216 231   MPV 11.4 11.8     Recent Labs     02/03/20  0019 02/04/20  0531   SODIUM 141 141   POTASSIUM 3.8 3.7   CHLORIDE 107 105   CO2 21 23   GLUCOSE 132* 111*   BUN 17 15   CREATININE 0.79 0.69   CALCIUM 9.0 9.0                   Imaging  DX-CHEST-PORTABLE (1 VIEW)   Final Result         1. No acute cardiopulmonary abnormalities are identified.            Assessment/Plan  Hyperthyroidism  Assessment & Plan  Tsh supressed. Will need outpatient follow up T4 is normal    Distal radius fracture  Assessment & Plan  S/p orif a week ago. Leading to even less mobility.   pain controlled with tylenol   Patient is being evaluated by insurance for SNF placement    UTI (urinary tract infection)  Assessment & Plan  Continue macrobid, urine culture is pending    Sepsis ([facility])  Assessment & Plan  This is Sepsis Not present on admission  SIRS criteria identified on my evaluation include: Tachycardia, with heart rate greater than 90 BPM and Leukocyosis, with WBC greater than 12,000  Source is uti  Sepsis protocol initiated  Fluid resuscitation ordered per protocol  IV antibiotics as appropriate for source of sepsis  While organ dysfunction may be noted elsewhere in this problem list or in the chart, degree of organ dysfunction does not meet CMS criteria for severe sepsis  Patient developed this shortly after admission with a heart rate of over 90 plus an admission leukocytosis over 12.    Now resolved        Generalized weakness  Assessment & Plan  More generalized fatigue  SNF pending    Essential hypertension  Assessment & Plan  Resume home dose of atenolol, norvasc and lisinopril       VTE prophylaxis: scd

## 2020-02-06 PROCEDURE — 97116 GAIT TRAINING THERAPY: CPT

## 2020-02-06 PROCEDURE — 700102 HCHG RX REV CODE 250 W/ 637 OVERRIDE(OP): Performed by: HOSPITALIST

## 2020-02-06 PROCEDURE — A9270 NON-COVERED ITEM OR SERVICE: HCPCS | Performed by: HOSPITALIST

## 2020-02-06 PROCEDURE — 97530 THERAPEUTIC ACTIVITIES: CPT

## 2020-02-06 PROCEDURE — 770006 HCHG ROOM/CARE - MED/SURG/GYN SEMI*

## 2020-02-06 PROCEDURE — 99231 SBSQ HOSP IP/OBS SF/LOW 25: CPT | Performed by: INTERNAL MEDICINE

## 2020-02-06 RX ADMIN — NITROFURANTOIN (MONOHYDRATE/MACROCRYSTALS) 100 MG: 75; 25 CAPSULE ORAL at 08:36

## 2020-02-06 RX ADMIN — LISINOPRIL 10 MG: 5 TABLET ORAL at 04:46

## 2020-02-06 RX ADMIN — NITROFURANTOIN (MONOHYDRATE/MACROCRYSTALS) 100 MG: 75; 25 CAPSULE ORAL at 17:22

## 2020-02-06 RX ADMIN — AMLODIPINE BESYLATE 5 MG: 5 TABLET ORAL at 04:46

## 2020-02-06 RX ADMIN — ATENOLOL 50 MG: 25 TABLET ORAL at 04:45

## 2020-02-06 RX ADMIN — PRAVASTATIN SODIUM 40 MG: 20 TABLET ORAL at 04:45

## 2020-02-06 ASSESSMENT — ENCOUNTER SYMPTOMS
TREMORS: 0
INSOMNIA: 0
VOMITING: 0
PALPITATIONS: 0
SPUTUM PRODUCTION: 0
DEPRESSION: 0
FEVER: 0
COUGH: 0
HEARTBURN: 0
WEAKNESS: 1
DIZZINESS: 0
SORE THROAT: 0
MYALGIAS: 0
SHORTNESS OF BREATH: 0
CONSTIPATION: 0

## 2020-02-06 ASSESSMENT — GAIT ASSESSMENTS
DEVIATION: STEP TO
DISTANCE (FEET): 150
ASSISTIVE DEVICE: HAND HELD ASSIST
GAIT LEVEL OF ASSIST: MINIMAL ASSIST

## 2020-02-06 NOTE — PROGRESS NOTES
0700: Bedside report from Tita PAYNE RN. Pt wakes to voice. No needs at this time. Pending placement. Denies pain at this time.

## 2020-02-06 NOTE — DISCHARGE PLANNING
RN CM received a phone call from СЕРГЕЙ Domingo from [facility]. Chayo stating that patients most recent PT/OT notes show that patient is no longer requiring SNF placement and SNF placement will not be covered. HH will be an appropriate DC plan.

## 2020-02-06 NOTE — DISCHARGE PLANNING
"Met with pt at bedside to discuss DC planning. Pt informed that insurance would not cover SNF placement. Pt stated, \"I can not go home. My  is sick and can not care for me. I need more help at home. My  works and ill be home alone almost all day.\"     Called Chayo with [facility]. Will fax PT notes to Chayo.   "

## 2020-02-06 NOTE — PROGRESS NOTES
[facility] Medicine Daily Progress Note    Date of Service  2/6/2020    Chief Complaint  79 y.o. female admitted 2/2/2020 with weakness and inability to care for self.     Hospital Course    She was found to have a UTI and related sepsis. She was treated with fluids and antibiotics. She also has a significant history of a distal radium fracture s/p ORIF a week prior to this admission. Her activity level has been poor at home and her  is unable to care for her. She was also found to have hyperthyroidism with a suppressed TSH but normal T4 level. She was treated with sepsis protocols , IV fluids and antibiotics.        Interval Problem Update  The patient complains of inability to function at home as her  cannot help her    Consultants/Specialty  None  Orthopedic surgery Dr. Mcpherson    Code Status  full    Disposition  snf    Review of Systems  Review of Systems   Constitutional: Positive for malaise/fatigue. Negative for fever.   HENT: Negative for congestion and sore throat.    Respiratory: Negative for cough, sputum production and shortness of breath.    Cardiovascular: Negative for chest pain, palpitations and leg swelling.   Gastrointestinal: Negative for constipation, heartburn and vomiting.   Genitourinary: Negative for dysuria, frequency and urgency.   Musculoskeletal: Negative for joint pain and myalgias.        Fingers on left hand with improved range of motion with less swelling   Skin: Negative for itching and rash.   Neurological: Positive for weakness. Negative for dizziness and tremors.   Psychiatric/Behavioral: Negative for depression. The patient does not have insomnia.         Physical Exam  Temp:  [36.4 °C (97.6 °F)-36.7 °C (98 °F)] 36.5 °C (97.7 °F)  Pulse:  [60-62] 61  Resp:  [16-18] 18  BP: (137-157)/(45-71) 137/48  SpO2:  [93 %-96 %] 95 %    Physical Exam  Vitals signs and nursing note reviewed.   Constitutional:       General: She is not in acute distress.     Appearance: She is  well-developed. She is not ill-appearing or diaphoretic.   HENT:      Nose: No rhinorrhea.      Mouth/Throat:      Mouth: Mucous membranes are moist.      Pharynx: Oropharynx is clear.   Eyes:      General: No scleral icterus.     Conjunctiva/sclera: Conjunctivae normal.   Neck:      Vascular: No JVD.   Cardiovascular:      Rate and Rhythm: Regular rhythm.      Pulses: Normal pulses.      Heart sounds: Normal heart sounds. No murmur.   Pulmonary:      Effort: Pulmonary effort is normal. No respiratory distress.      Breath sounds: Normal breath sounds. No wheezing or rales.   Abdominal:      General: Bowel sounds are normal. There is no distension.      Palpations: Abdomen is soft.      Tenderness: There is no tenderness.   Musculoskeletal:         General: No tenderness.      Comments: Left arm in sling with impaired mobility   Skin:     General: Skin is warm and dry.      Findings: No erythema.   Neurological:      Mental Status: She is alert and oriented to person, place, and time.      Motor: Weakness present.      Coordination: Coordination abnormal.   Psychiatric:         Mood and Affect: Mood normal.         Behavior: Behavior normal.         Thought Content: Thought content normal.         Fluids    Intake/Output Summary (Last 24 hours) at 2/6/2020 1405  Last data filed at 2/6/2020 0800  Gross per 24 hour   Intake 440 ml   Output --   Net 440 ml       Laboratory  Recent Labs     02/04/20  0531   WBC 7.9   RBC 3.59*   HEMOGLOBIN 10.4*   HEMATOCRIT 32.6*   MCV 90.8   MCH 29.0   MCHC 31.9*   RDW 43.9   PLATELETCT 231   MPV 11.8     Recent Labs     02/04/20  0531   SODIUM 141   POTASSIUM 3.7   CHLORIDE 105   CO2 23   GLUCOSE 111*   BUN 15   CREATININE 0.69   CALCIUM 9.0                   Imaging  DX-CHEST-PORTABLE (1 VIEW)   Final Result         1. No acute cardiopulmonary abnormalities are identified.           Assessment/Plan  Hyperthyroidism  Assessment & Plan  Tsh supressed. Will need outpatient follow up T4  is normal    Distal radius fracture  Assessment & Plan  ORIF surgical repair on previous admission, patient failed discharge home due to inability to use her arm  Leading to even less mobility.   pain controlled with tylenol   Patient is being evaluated by insurance for SNF placement appropriateness    UTI (urinary tract infection)  Assessment & Plan  macrobid to complete today, no growth in urine culture reported    Sepsis (HCC)  Assessment & Plan  This is Sepsis Not present on admission  SIRS criteria identified on my evaluation include: Tachycardia, with heart rate greater than 90 BPM and Leukocyosis, with WBC greater than 12,000  Source is uti  Sepsis protocol initiated  Fluid resuscitation ordered per protocol  IV antibiotics as appropriate for source of sepsis  While organ dysfunction may be noted elsewhere in this problem list or in the chart, degree of organ dysfunction does not meet CMS criteria for severe sepsis  Patient developed this shortly after admission with a heart rate of over 90 plus an admission leukocytosis over 12.    Now resolved        Generalized weakness  Assessment & Plan  More generalized fatigue  SNF pending    Essential hypertension  Assessment & Plan  Resume home dose of atenolol, norvasc and lisinopril       VTE prophylaxis: scd

## 2020-02-06 NOTE — CARE PLAN
Problem: Skin Integrity  Goal: Risk for impaired skin integrity will decrease  Outcome: PROGRESSING AS EXPECTED  Note:   Waffle cushion placed in chair to prevent pressure sore.

## 2020-02-06 NOTE — THERAPY
"Physical Therapy Treatment completed.   Bed Mobility:  Supine to Sit: (Up in chair pre and post session)  Transfers: Sit to Stand: Minimal Assist  Gait: Level Of Assist: Minimal Assist with hand held A x 150 feet       Plan of Care: Will benefit from Physical Therapy 4 times per week  Discharge Recommendations: Equipment: Will Continue to Assess for Equipment Needs. Post-acute therapy Discharge to a transitional care facility for continued skilled therapy services.   Pt had difficulty with sit to stand Once standing ambulated min A with Hand held assistance Strength and endurance improving  See \"Rehab Therapy-Acute\" Patient Summary Report for complete documentation.       "

## 2020-02-06 NOTE — CARE PLAN
Problem: Venous Thromboembolism (VTW)/Deep Vein Thrombosis (DVT) Prevention:  Goal: Patient will participate in Venous Thrombosis (VTE)/Deep Vein Thrombosis (DVT)Prevention Measures  Outcome: PROGRESSING AS EXPECTED  Flowsheets  Taken 2/5/2020 6615  Mechanical Prophylaxis : SCDs, Sequential Compression Device  SCDs, Sequential Compression Device: On  Taken 2/5/2020 2000  Pharmacologic Prophylaxis Used: LMWH: Enoxaparin(Lovenox)

## 2020-02-06 NOTE — CARE PLAN
Problem: Knowledge Deficit  Goal: Knowledge of disease process/condition, treatment plan, diagnostic tests, and medications will improve  Outcome: PROGRESSING AS EXPECTED  Intervention: Explain information regarding disease process/condition, treatment plan, diagnostic tests, and medications and document in education  Note:   Plan of care discussed with pt.     Problem: Communication  Goal: The ability to communicate needs accurately and effectively will improve  Intervention: [place] patient and significant other/support system to call light to alert staff of needs  Flowsheets (Taken 2/5/2020 9464)  Oriented to:: Call Light & Bedside Controls; Unit Routine  Note:   Pt encouraged to use call light for needs.

## 2020-02-07 PROCEDURE — A9270 NON-COVERED ITEM OR SERVICE: HCPCS | Performed by: HOSPITALIST

## 2020-02-07 PROCEDURE — 700111 HCHG RX REV CODE 636 W/ 250 OVERRIDE (IP): Performed by: HOSPITALIST

## 2020-02-07 PROCEDURE — 86480 TB TEST CELL IMMUN MEASURE: CPT

## 2020-02-07 PROCEDURE — 770006 HCHG ROOM/CARE - MED/SURG/GYN SEMI*

## 2020-02-07 PROCEDURE — 99232 SBSQ HOSP IP/OBS MODERATE 35: CPT | Performed by: INTERNAL MEDICINE

## 2020-02-07 PROCEDURE — 700102 HCHG RX REV CODE 250 W/ 637 OVERRIDE(OP): Performed by: HOSPITALIST

## 2020-02-07 PROCEDURE — 36415 COLL VENOUS BLD VENIPUNCTURE: CPT

## 2020-02-07 RX ORDER — ACETAMINOPHEN 325 MG/1
650 TABLET ORAL EVERY 6 HOURS PRN
Qty: 30 TAB | Refills: 0 | Status: SHIPPED | OUTPATIENT
Start: 2020-02-07

## 2020-02-07 RX ORDER — ATENOLOL 50 MG/1
50 TABLET ORAL DAILY
Qty: 30 TAB | Refills: 3 | Status: SHIPPED | OUTPATIENT
Start: 2020-02-08

## 2020-02-07 RX ORDER — LISINOPRIL 10 MG/1
10 TABLET ORAL DAILY
Qty: 30 TAB | Refills: 3 | Status: SHIPPED | OUTPATIENT
Start: 2020-02-08

## 2020-02-07 RX ADMIN — PRAVASTATIN SODIUM 40 MG: 20 TABLET ORAL at 05:13

## 2020-02-07 RX ADMIN — LISINOPRIL 10 MG: 5 TABLET ORAL at 05:13

## 2020-02-07 RX ADMIN — ATENOLOL 50 MG: 25 TABLET ORAL at 05:13

## 2020-02-07 RX ADMIN — ENOXAPARIN SODIUM 40 MG: 100 INJECTION SUBCUTANEOUS at 05:14

## 2020-02-07 RX ADMIN — AMLODIPINE BESYLATE 5 MG: 5 TABLET ORAL at 05:13

## 2020-02-07 RX ADMIN — ACETAMINOPHEN 650 MG: 325 TABLET, FILM COATED ORAL at 18:08

## 2020-02-07 ASSESSMENT — ENCOUNTER SYMPTOMS
COUGH: 0
FEVER: 0
CHILLS: 0
HEARTBURN: 0
WEAKNESS: 1
VOMITING: 0
SHORTNESS OF BREATH: 0
DIZZINESS: 0
CONSTIPATION: 0
DEPRESSION: 0
SORE THROAT: 0
WHEEZING: 0
ABDOMINAL PAIN: 0
MYALGIAS: 0
NERVOUS/ANXIOUS: 0
TREMORS: 0

## 2020-02-07 NOTE — CARE PLAN
Problem: Communication  Goal: The ability to communicate needs accurately and effectively will improve  Outcome: PROGRESSING AS EXPECTED     Problem: Safety  Goal: Will remain free from injury  Outcome: PROGRESSING AS EXPECTED  Goal: Will remain free from falls  Outcome: PROGRESSING AS EXPECTED     Problem: Pain Management  Goal: Pain level will decrease to patient's comfort goal  Outcome: PROGRESSING AS EXPECTED     Problem: Mobility  Goal: Risk for activity intolerance will decrease  Outcome: PROGRESSING AS EXPECTED     Problem: Skin Integrity  Goal: Risk for impaired skin integrity will decrease  Outcome: PROGRESSING AS EXPECTED     Pt had been getting up to BR with assistance and walker for assistance. Pt ambulated in hallway with PT without walker to gym. Pt still concerned for going home but is working on getting stronger.

## 2020-02-07 NOTE — PROGRESS NOTES
[facility] Medicine Daily Progress Note    Date of Service  2/7/2020    Chief Complaint  79 y.o. female admitted 2/2/2020 with weakness and inability to care for self.     Hospital Course    She was found to have a UTI and related sepsis. She was treated with fluids and antibiotics. She also has a significant history of a distal radium fracture s/p ORIF a week prior to this admission. Her activity level has been poor at home and her  is unable to care for her. She was also found to have hyperthyroidism with a suppressed TSH but normal T4 level. She was treated with sepsis protocols , IV fluids and antibiotics.        Interval Problem Update  The patient has no complaints, she does need moderate assistance with getting up to standing from sitting    Consultants/Specialty  None  Orthopedic surgery Dr. Mcpherson    Code Status  full    Disposition  snf    Review of Systems  Review of Systems   Constitutional: Negative for chills, fever and malaise/fatigue.   HENT: Negative for congestion and sore throat.    Respiratory: Negative for cough, shortness of breath and wheezing.    Cardiovascular: Negative for chest pain and leg swelling.   Gastrointestinal: Negative for abdominal pain, constipation, heartburn and vomiting.   Genitourinary: Negative for dysuria and frequency.   Musculoskeletal: Negative for myalgias.        Fingers on left hand impaired range of motion   Skin: Negative for itching.   Neurological: Positive for weakness. Negative for dizziness and tremors.   Psychiatric/Behavioral: Negative for depression. The patient is not nervous/anxious.         Physical Exam  Temp:  [36.4 °C (97.6 °F)-36.9 °C (98.5 °F)] 36.4 °C (97.6 °F)  Pulse:  [59] 59  Resp:  [16-18] 16  BP: (143-171)/(41-62) 147/41  SpO2:  [94 %-96 %] 95 %    Physical Exam  Vitals signs and nursing note reviewed.   Constitutional:       General: She is not in acute distress.     Appearance: She is well-developed. She is not diaphoretic.   HENT:       Nose: No congestion or rhinorrhea.      Mouth/Throat:      Mouth: Mucous membranes are moist.      Pharynx: No oropharyngeal exudate or posterior oropharyngeal erythema.   Eyes:      General: No scleral icterus.     Pupils: Pupils are equal, round, and reactive to light.   Neck:      Vascular: No JVD.   Cardiovascular:      Rate and Rhythm: Normal rate and regular rhythm.      Pulses: Normal pulses.      Heart sounds: Normal heart sounds. No murmur.   Pulmonary:      Effort: Pulmonary effort is normal. No respiratory distress.      Breath sounds: Normal breath sounds. No wheezing or rales.   Abdominal:      General: Bowel sounds are normal. There is no distension.      Tenderness: There is no tenderness.   Musculoskeletal:         General: No tenderness.      Comments: Left arm in sling with impaired mobility   Skin:     General: Skin is warm and dry.      Coloration: Skin is pale.      Findings: No erythema.   Neurological:      Mental Status: She is alert and oriented to person, place, and time.      Motor: Weakness present.      Coordination: Coordination abnormal.      Comments: Left arm weak   Psychiatric:         Mood and Affect: Mood normal.         Behavior: Behavior normal.         Fluids    Intake/Output Summary (Last 24 hours) at 2/7/2020 1221  Last data filed at 2/6/2020 1800  Gross per 24 hour   Intake 220 ml   Output --   Net 220 ml       Laboratory                        Imaging  DX-CHEST-PORTABLE (1 VIEW)   Final Result         1. No acute cardiopulmonary abnormalities are identified.           Assessment/Plan  Hyperthyroidism  Assessment & Plan  Tsh supressed.  T4 is normal    Distal radius fracture  Assessment & Plan  ORIF surgical repair on previous admission, patient failed discharge home due to inability to use her arm  Patient could not get off the toilet at home and  was not able to assist her well enough   pain controlled with tylenol   Patient is considering assisted living with  dressing and therapy resources    UTI (urinary tract infection)  Assessment & Plan  macrobid completed    Sepsis (HCC)  Assessment & Plan  This is Sepsis Not present on admission  SIRS criteria identified on my evaluation include: Tachycardia, with heart rate greater than 90 BPM and Leukocyosis, with WBC greater than 12,000  Source is uti  Now resolved      Generalized weakness  Assessment & Plan  More generalized fatigue  Discharge pending placement option arrangement    Essential hypertension  Assessment & Plan  Resume home dose of atenolol, norvasc and lisinopril  Well controlled  in addition to the exam of the patient and discussion about her plan of care I spent from 11:13 until 12:22 filling out paperwork for assisted living with the patient and going over all details.    VTE prophylaxis: scd

## 2020-02-07 NOTE — DISCHARGE PLANNING
Care Transition Team Assessment    Information Source  Orientation : Oriented x 4  Information Given By: Patient  Informant's Name: Shama Herrera  Who is responsible for making decisions for patient? : Patient    Readmission Evaluation  Is this a readmission?: Yes - unplanned readmission    Elopement Risk  Legal Hold: No  Ambulatory or Self Mobile in Wheelchair: Yes  Disoriented: No  Psychiatric Symptoms: None  History of Wandering: No  Elopement this Admit: No  Vocalizing Wanting to Leave: No  Displays Behaviors, Body Language Wanting to Leave: No-Not at Risk for Elopement  Elopement Risk: Not at Risk for Elopement    Interdisciplinary Discharge Planning  Does Admitting Nurse Feel This Could be a Complex Discharge?: No  Primary Care Physician: Dr Bolivar Basurto  Lives with - Patient's Self Care Capacity: Spouse  Patient or legal guardian wants to designate a caregiver (see row info): No  Support Systems: Family Member(s)  Housing / Facility: [address] [place] [address]  Do You Take your Prescribed Medications Regularly: No  Reasons Why Not Taking Medications : (Patient reports she didn't want it to affect her left wrist)  Able to Return to Previous ADL's: Future Time w/Therapy  Mobility Issues: Yes  Prior Services: Intermittent Physical Support for ADL Per Family  Patient Expects to be Discharged to:: Home  Assistance Needed: Unknown at this Time  Durable Medical Equipment: Not Applicable    Discharge Preparedness  What is your plan after discharge?: Skilled nursing facility  What are your discharge supports?: Partner, Other (comment)(step-child)  Prior Functional Level: Ambulatory, Independent with Activities of Daily Living, Independent with Medication Management    Functional Assesment  Prior Functional Level: Ambulatory, Independent with Activities of Daily Living, Independent with Medication Management    Finances  Financial Barriers to Discharge: No    Vision / Hearing Impairment  Vision Impairment : Yes  Right Eye Vision:  Impaired, Wears Glasses  Left Eye Vision: Impaired, Wears Glasses  Hearing Impairment : No    Values / Beliefs / Concerns  Values / Beliefs Concerns : No    Advance Directive  Advance Directive?: None    Domestic Abuse  Have you ever been the victim of abuse or violence?: No  Physical Abuse or Sexual Abuse: No  Verbal Abuse or Emotional Abuse: No  Possible Abuse Reported to:: Not Applicable    Psychological Assessment  History of Substance Abuse: None  History of Psychiatric Problems: No  Non-compliant with Treatment: No  Newly Diagnosed Illness: No    Anticipated Discharge Information  Anticipated discharge disposition: (TBD)  Discharge Contact Phone Number: [phone]

## 2020-02-07 NOTE — PROGRESS NOTES
0700: Bedside report from Jessica PAYNE RN. Pt wakes to voice. No needs at this time. Pending possible placement vs HH. Pt requesting placement due to concern for further falls with walker at home when attempting to get up.    0800: Spoke with OT today to have them reevaluate pt for placement vs HH.

## 2020-02-07 NOTE — PROGRESS NOTES
A&Ox4, VSS, denies pain. POC discussed. Pt denies further needs at this time. Safety measures and hourly rounding in place.

## 2020-02-07 NOTE — CARE PLAN
Problem: Communication  Goal: The ability to communicate needs accurately and effectively will improve  Outcome: PROGRESSING AS EXPECTED     Problem: Safety  Goal: Will remain free from falls  Outcome: PROGRESSING AS EXPECTED     Problem: Infection  Goal: Will remain free from infection  Outcome: PROGRESSING AS EXPECTED     Problem: Venous Thromboembolism (VTW)/Deep Vein Thrombosis (DVT) Prevention:  Goal: Patient will participate in Venous Thrombosis (VTE)/Deep Vein Thrombosis (DVT)Prevention Measures  Outcome: PROGRESSING AS EXPECTED     Problem: Skin Integrity  Goal: Risk for impaired skin integrity will decrease  Outcome: PROGRESSING AS EXPECTED

## 2020-02-08 PROCEDURE — 99231 SBSQ HOSP IP/OBS SF/LOW 25: CPT | Performed by: INTERNAL MEDICINE

## 2020-02-08 PROCEDURE — 770006 HCHG ROOM/CARE - MED/SURG/GYN SEMI*

## 2020-02-08 PROCEDURE — A9270 NON-COVERED ITEM OR SERVICE: HCPCS | Performed by: HOSPITALIST

## 2020-02-08 PROCEDURE — 306396 CUSHION, WAFFLE ADULT 17X17: Performed by: INTERNAL MEDICINE

## 2020-02-08 PROCEDURE — 700111 HCHG RX REV CODE 636 W/ 250 OVERRIDE (IP): Performed by: HOSPITALIST

## 2020-02-08 PROCEDURE — 700102 HCHG RX REV CODE 250 W/ 637 OVERRIDE(OP): Performed by: HOSPITALIST

## 2020-02-08 RX ADMIN — PRAVASTATIN SODIUM 40 MG: 20 TABLET ORAL at 05:55

## 2020-02-08 RX ADMIN — ENOXAPARIN SODIUM 40 MG: 100 INJECTION SUBCUTANEOUS at 05:56

## 2020-02-08 RX ADMIN — LISINOPRIL 10 MG: 5 TABLET ORAL at 05:55

## 2020-02-08 RX ADMIN — AMLODIPINE BESYLATE 5 MG: 5 TABLET ORAL at 05:55

## 2020-02-08 RX ADMIN — ATENOLOL 50 MG: 25 TABLET ORAL at 05:55

## 2020-02-08 ASSESSMENT — ENCOUNTER SYMPTOMS
SORE THROAT: 0
COUGH: 0
FEVER: 0
HEADACHES: 0
DIZZINESS: 0
DEPRESSION: 0
HEARTBURN: 0
CONSTIPATION: 0
CHILLS: 0
SHORTNESS OF BREATH: 0
WEAKNESS: 1
NERVOUS/ANXIOUS: 0
ABDOMINAL PAIN: 0

## 2020-02-08 NOTE — PROGRESS NOTES
Hospital Medicine Daily Progress Note    Date of Service  2/8/2020    Chief Complaint  79 y.o. female admitted 2/2/2020 with weakness and inability to care for self.     Hospital Course    She was found to have a UTI and related sepsis. She was treated with fluids and antibiotics. She also has a significant history of a distal radium fracture s/p ORIF a week prior to this admission. Her activity level has been poor at home and her  is unable to care for her. She was also found to have hyperthyroidism with a suppressed TSH but normal T4 level. She was treated with sepsis protocols , IV fluids and antibiotics.        Interval Problem Update  The patient continues to require assistance with mobility and ADL's    Consultants/Specialty  None  Orthopedic surgery Dr. Mcpherson    Code Status  full    Disposition  Assisted living    Review of Systems  Review of Systems   Constitutional: Negative for chills, fever and malaise/fatigue.   HENT: Negative for congestion and sore throat.    Respiratory: Negative for cough and shortness of breath.    Cardiovascular: Negative for chest pain and leg swelling.   Gastrointestinal: Negative for abdominal pain, constipation and heartburn.   Genitourinary: Negative for frequency and urgency.   Musculoskeletal:        Left arm impaired mobility   Skin: Negative for itching.   Neurological: Positive for weakness. Negative for dizziness and headaches.   Psychiatric/Behavioral: Negative for depression. The patient is not nervous/anxious.         Physical Exam  Temp:  [36.6 °C (97.9 °F)-36.7 °C (98.1 °F)] 36.7 °C (98.1 °F)  Pulse:  [58-64] 60  Resp:  [18] 18  BP: (131-156)/(41-53) 142/41  SpO2:  [90 %-94 %] 94 %    Physical Exam  Vitals signs and nursing note reviewed.   Constitutional:       General: She is not in acute distress.     Appearance: She is well-developed. She is not diaphoretic.   HENT:      Nose: No rhinorrhea.      Mouth/Throat:      Mouth: Mucous membranes are moist.       Pharynx: No oropharyngeal exudate.   Eyes:      General: No scleral icterus.     Pupils: Pupils are equal, round, and reactive to light.   Neck:      Vascular: No JVD.   Cardiovascular:      Rate and Rhythm: Normal rate and regular rhythm.      Pulses: Normal pulses.      Heart sounds: Normal heart sounds. No murmur.   Pulmonary:      Effort: Pulmonary effort is normal. No respiratory distress.      Breath sounds: Normal breath sounds. No stridor. No wheezing or rales.   Abdominal:      General: Bowel sounds are normal.      Palpations: Abdomen is soft.      Tenderness: There is no tenderness.   Musculoskeletal:         General: No tenderness.      Comments: Left arm in sling with impaired mobility   Skin:     General: Skin is warm.      Coloration: Skin is pale. Skin is not jaundiced.      Findings: No erythema.   Neurological:      Mental Status: She is alert and oriented to person, place, and time.      Motor: Weakness present.      Coordination: Coordination abnormal.      Comments: Left arm weak   Psychiatric:         Mood and Affect: Mood normal.         Behavior: Behavior normal.         Thought Content: Thought content normal.         Fluids    Intake/Output Summary (Last 24 hours) at 2/8/2020 1437  Last data filed at 2/8/2020 1159  Gross per 24 hour   Intake 120 ml   Output --   Net 120 ml       Laboratory                        Imaging  DX-CHEST-PORTABLE (1 VIEW)   Final Result         1. No acute cardiopulmonary abnormalities are identified.           Assessment/Plan  Hyperthyroidism  Assessment & Plan  Tsh supressed.  T4 is normal recommend outpatient follow up    Distal radius fracture  Assessment & Plan  ORIF surgical repair on previous admission, patient failed discharge home due to inability to use her arm  Patient could not get off the toilet at home and  was not able to assist her well enough   pain controlled with tylenol   Patient is considering assisted living with dressing and therapy  resources or discharge home with assistance    UTI (urinary tract infection)  Assessment & Plan  macrobid completed    Sepsis (HCC)  Assessment & Plan  This is Sepsis Not present on admission  SIRS criteria identified on my evaluation include: Tachycardia, with heart rate greater than 90 BPM and Leukocyosis, with WBC greater than 12,000  Source is uti  Now resolved      Generalized weakness  Assessment & Plan  More generalized fatigue, at patient's baseline    Essential hypertension  Assessment & Plan  Controlled on  home dose of atenolol, norvasc and lisinopril        VTE prophylaxis: scd

## 2020-02-08 NOTE — PROGRESS NOTES
Discussed waffle mattress and chair waffle with patient.  Patient not mobilizing as much as she can and should.  Patient stays in chair and uses purewick rather then walking to bathroom, even though she isn't incontinent (see previous note). Patient stated she had one and it was uncomfortable, I expressed that since she is immobile and not shifting weight as she stays in chair I need to do skin interventions.  Placed waffle on bed, will order chair waffle.  Patient at high risk for skin breakdown due to immobility and non-compliance with mobilization and using a diversion device. Patient using pure wick, unable to place mepilex because of this.

## 2020-02-08 NOTE — PROGRESS NOTES
Leslie from [facility] calling inquiring about patients shon, as of the time of the call it was still in process.

## 2020-02-08 NOTE — CARE PLAN
Problem: Knowledge Deficit  Goal: Knowledge of disease process/condition, treatment plan, diagnostic tests, and medications will improve  2/8/2020 1121 by Ning Ahuja R.N.  Outcome: PROGRESSING SLOWER THAN EXPECTED  2/8/2020 1120 by Ning Ahuja R.N.  Outcome: PROGRESSING AS EXPECTED  Goal: Knowledge of the prescribed therapeutic regimen will improve  2/8/2020 1121 by Ning Ahuja R.N.  Outcome: PROGRESSING SLOWER THAN EXPECTED  2/8/2020 1120 by Ning Ahuja R.N.  Outcome: PROGRESSING AS EXPECTED  See noted related to patient barriers for discharge     Problem: Discharge Barriers/Planning  Goal: Patient's continuum of care needs will be met  2/8/2020 1121 by Ning Ahuja R.N.  Outcome: PROGRESSING SLOWER THAN EXPECTED  2/8/2020 1120 by Ning Ahuja R.N.  Outcome: PROGRESSING AS EXPECTED     Problem: Mobility  Goal: Risk for activity intolerance will decrease  2/8/2020 1121 by Ning Ahuja R.N.  Outcome: PROGRESSING SLOWER THAN EXPECTED  2/8/2020 1120 by Ning Ahuja R.N.  Outcome: PROGRESSING AS EXPECTED  Patient not self motivated. Heavy encouragement     Problem: Urinary Elimination:  Goal: Ability to reestablish a normal urinary elimination pattern will improve  2/8/2020 1121 by Ning Ahuja, R.N.  Outcome: PROGRESSING SLOWER THAN EXPECTED  2/8/2020 1120 by Ning Ahuja R.N.  Outcome: PROGRESSING AS EXPECTED   Discussed not using purewick as previous for primary elimination method and walking to bathroom as directed by Pt/OT and nursing

## 2020-02-08 NOTE — PROGRESS NOTES
Patient with numerous concerns about her post hospitalization care.  I had previously read therapy and care management notes. Discussed those with her as a review.  Patient doesn't like idea of moving [place] an empty apartment [place] (assisted living)  Expressed that even though the location may be on a hill as it is assisted living they have mobility devices as would be appropriate (ramps rails).  She said her  is debilitated and although he is home he cant help her enough.  We discussed that her main concern is getting up from toilet, discussed a toilet lift or a bedside commode that could go over the toilet to elevate her and make it easier for her to transfer herself, she seemed interested in the commode as it has side handles she can use to help stabilize herself.  She was concerned about being left handed and how it impairs her, we discussed that out patient therapy as recommended would help her adapt to her circumstance, but it is also temporary.  I discussed that if she is unhappy with assisted living as it seems so other options would be to privately pay for a caregiver or snf as her insurance unfortunately doesn't cover it.  Patient unhappy and anxious about leaving.  Patient insistent she needs a person to take care of her.  Discussed working on mobilizing as was recommended by PT and OT, discussed as was previous with PT/OT ambulating to bathroom when she needs to void and not using pure wick as she has been.  She then asked if she should just get up, I expressed that if she feels the need to urinate to please hit her call bell so we can assist her to the restroom.  Patient did not appear to like that idea, reenforcement likely needed and heavy encouragement.

## 2020-02-09 PROCEDURE — 700102 HCHG RX REV CODE 250 W/ 637 OVERRIDE(OP): Performed by: HOSPITALIST

## 2020-02-09 PROCEDURE — 770006 HCHG ROOM/CARE - MED/SURG/GYN SEMI*

## 2020-02-09 PROCEDURE — 700111 HCHG RX REV CODE 636 W/ 250 OVERRIDE (IP): Performed by: HOSPITALIST

## 2020-02-09 PROCEDURE — 99231 SBSQ HOSP IP/OBS SF/LOW 25: CPT | Performed by: INTERNAL MEDICINE

## 2020-02-09 PROCEDURE — A9270 NON-COVERED ITEM OR SERVICE: HCPCS | Performed by: HOSPITALIST

## 2020-02-09 RX ADMIN — ENOXAPARIN SODIUM 40 MG: 100 INJECTION SUBCUTANEOUS at 04:49

## 2020-02-09 RX ADMIN — PRAVASTATIN SODIUM 40 MG: 20 TABLET ORAL at 04:48

## 2020-02-09 RX ADMIN — ACETAMINOPHEN 650 MG: 325 TABLET, FILM COATED ORAL at 11:39

## 2020-02-09 RX ADMIN — AMLODIPINE BESYLATE 5 MG: 5 TABLET ORAL at 04:49

## 2020-02-09 RX ADMIN — ACETAMINOPHEN 650 MG: 325 TABLET, FILM COATED ORAL at 01:48

## 2020-02-09 RX ADMIN — ATENOLOL 50 MG: 25 TABLET ORAL at 04:48

## 2020-02-09 RX ADMIN — LISINOPRIL 10 MG: 5 TABLET ORAL at 04:49

## 2020-02-09 RX ADMIN — POLYETHYLENE GLYCOL (3350) 1 PACKET: 17 POWDER, FOR SOLUTION ORAL at 21:28

## 2020-02-09 ASSESSMENT — ENCOUNTER SYMPTOMS
DIARRHEA: 0
DIZZINESS: 0
SHORTNESS OF BREATH: 0
FEVER: 0
DIAPHORESIS: 0
CONSTIPATION: 0
SPEECH CHANGE: 0
DEPRESSION: 0
COUGH: 0
SORE THROAT: 0
ABDOMINAL PAIN: 0
HEARTBURN: 0
FOCAL WEAKNESS: 0
WEAKNESS: 1
PALPITATIONS: 0
NERVOUS/ANXIOUS: 0

## 2020-02-09 NOTE — DISCHARGE PLANNING
"LSW spoke with  Babatunde over the phone regarding d/c plan. Per Babatunde, pt was looking at having additional support at home or going to senior living. Per Babatunde, they have not been able to find one that has an opening. LSW stated she would email the  list for options. LSW and Babatunde discussed that pt is medically cleared and that if she needs additional support at home they can look at hiring a private care giver or maybe HHC. Babatunde concerned that pts insurance will stop paying and LSW stated that once MD put in d/c order that would be the case and that pt can review senior living options out patient. Babatunde stated he would talk to his son about options.     LSW and RN spoke with pt about d/c. Pt concerned she is being \"kicked out.\" LSW stated that she is not but that the MD will be clearing her for d/c. LSW provided IMM and stated that pt can call to appeal d/c as her insurance will stop paying once d/c orders are in. LSW discussed [facility] options, private care giving options and HHC. LSW provided referral forms and stated that she had already talked to Babatunde about d/c. Pt stated understanding and LSW encouraged her to review in home care options to set up a success return to home and to appeal d/c if she feels she needs to do so.   "

## 2020-02-09 NOTE — PROGRESS NOTES
Report received from night shift RN. Assume care. Pt. AAOx4 pt is on [facility] chair-  Assessment completed. VSS. Denies pain, able to wiggle toes and dorsi/plantar flex feet, good CMS and pulses to kelly LE, swelling pte to ankles. Pt was update for the care for the day. White board updated, All question answered. Pt has call light within reach,  bed is in the lowest position. Pt has no other needs at this time.   1100- Pt refusing to ambulated regardless education -APAP given for kelly LE pain  1450- Pt was updated about her DC orders. LSW gave all HH and private references for Pt to called. Pt states she is no ready to DC today. Pt was provided with DC appeal paper work as well  1515- Pt did not called to insurance about staying one more NOC, Pt was strongly encouraged to called them about refusing DC today.

## 2020-02-09 NOTE — PROGRESS NOTES
Pt c/o pain in left heel. She states that she believes it's from ambulating. Pt's foot floated over pillow this shift. Pt states she has intermittent 10/10 spasm pain in BLE.     Pt declined to sleep in bed, and chose to sleep in recliner instead due to, as pt states, the bed being uncomfortable and causing her back to hurt.    Pt continues to want to use purewick rather than use bathroom.    Dressing, cast, and sling in place. Pt c/o numbness and tingling in her L fingers. There is no increase in swelling or pain in LUE. HARRY LUE pulse but cap refill is < 3 seconds.

## 2020-02-09 NOTE — PROGRESS NOTES
Hospital Medicine Daily Progress Note    Date of Service  2/9/2020    Chief Complaint  79 y.o. female admitted 2/2/2020 with weakness and inability to care for self.     Hospital Course    She was found to have a UTI and related sepsis. She was treated with fluids and antibiotics. She also has a significant history of a distal radium fracture s/p ORIF a week prior to this admission. Her activity level has been poor at home and her  is unable to care for her. She was also found to have hyperthyroidism with a suppressed TSH but normal T4 level. She was treated with sepsis protocols , IV fluids and antibiotics.        Interval Problem Update  The patient is going over finances with her son today to decide on an appropriate assisted living facility    Consultants/Specialty  None  Orthopedic surgery Dr. Mcpherson    Code Status  full    Disposition  Assisted living    Review of Systems  Review of Systems   Constitutional: Negative for diaphoresis, fever and malaise/fatigue.   HENT: Negative for congestion and sore throat.    Respiratory: Negative for cough and shortness of breath.    Cardiovascular: Negative for chest pain, palpitations and leg swelling.   Gastrointestinal: Negative for abdominal pain, constipation, diarrhea and heartburn.   Genitourinary: Negative for dysuria, hematuria and urgency.   Musculoskeletal:        Left arm impaired mobility   Neurological: Positive for weakness. Negative for dizziness, speech change and focal weakness.   Psychiatric/Behavioral: Negative for depression. The patient is not nervous/anxious.         Physical Exam  Temp:  [36.8 °C (98.2 °F)-37.1 °C (98.8 °F)] 37.1 °C (98.8 °F)  Pulse:  [60-66] 61  Resp:  [18] 18  BP: (149-166)/(39-59) 149/45  SpO2:  [91 %-96 %] 96 %    Physical Exam  Vitals signs and nursing note reviewed.   Constitutional:       General: She is not in acute distress.     Appearance: She is well-developed.   HENT:      Nose: No rhinorrhea.      Mouth/Throat:       Mouth: Mucous membranes are moist.   Eyes:      General: No scleral icterus.        Right eye: No discharge.         Left eye: No discharge.   Neck:      Musculoskeletal: Neck supple. No muscular tenderness.      Vascular: No JVD.   Cardiovascular:      Rate and Rhythm: Normal rate and regular rhythm.      Heart sounds: Normal heart sounds. No murmur.   Pulmonary:      Effort: Pulmonary effort is normal. No respiratory distress.      Breath sounds: Normal breath sounds. No stridor. No wheezing or rales.   Abdominal:      General: Bowel sounds are normal.      Palpations: Abdomen is soft.      Tenderness: There is no tenderness.   Musculoskeletal:         General: No tenderness.      Comments: Left arm in sling with impaired mobility   Skin:     General: Skin is warm and dry.      Coloration: Skin is pale. Skin is not jaundiced.   Neurological:      Mental Status: She is alert and oriented to person, place, and time.      Motor: Weakness present.      Coordination: Coordination abnormal.      Comments: Left arm weak   Psychiatric:         Mood and Affect: Mood normal.         Behavior: Behavior normal.         Thought Content: Thought content normal.         Fluids    Intake/Output Summary (Last 24 hours) at 2/9/2020 1224  Last data filed at 2/9/2020 0900  Gross per 24 hour   Intake 600 ml   Output 700 ml   Net -100 ml       Laboratory                        Imaging  DX-CHEST-PORTABLE (1 VIEW)   Final Result         1. No acute cardiopulmonary abnormalities are identified.           Assessment/Plan  Hyperthyroidism  Assessment & Plan  Tsh supressed.  T4 is normal recommend outpatient follow up    Distal radius fracture  Assessment & Plan  ORIF surgical repair on previous admission, patient failed discharge home due to inability to use her arm  Patient could not get off the toilet at home and  was unable to get her up  here she requires assistance getting up from any sitting position    UTI (urinary tract  infection)  Assessment & Plan  macrobid completed    Sepsis (HCC)  Assessment & Plan  This is Sepsis Not present on admission  SIRS criteria identified on my evaluation include: Tachycardia, with heart rate greater than 90 BPM and Leukocyosis, with WBC greater than 12,000  Source is uti  Now resolved      Generalized weakness  Assessment & Plan  More generalized fatigue, at patient's baseline    Essential hypertension  Assessment & Plan  Controlled on  home dose of atenolol, norvasc and lisinopril        VTE prophylaxis: scd

## 2020-02-09 NOTE — CARE PLAN
Problem: Safety  Goal: Will remain free from injury  Outcome: PROGRESSING AS EXPECTED     Problem: Bowel/Gastric:  Goal: Normal bowel function is maintained or improved  Outcome: PROGRESSING AS EXPECTED     Problem: Knowledge Deficit  Goal: Knowledge of disease process/condition, treatment plan, diagnostic tests, and medications will improve  Outcome: PROGRESSING AS EXPECTED     Problem: Pain Management  Goal: Pain level will decrease to patient's comfort goal  Outcome: PROGRESSING AS EXPECTED

## 2020-02-09 NOTE — CARE PLAN
Problem: Safety  Goal: Will remain free from injury  Outcome: PROGRESSING AS EXPECTED  Goal: Will remain free from falls  Outcome: PROGRESSING AS EXPECTED  Intervention: Assess risk factors for falls  Flowsheets  Taken 2/8/2020 1400 by BEATRIZ RiveraN.GIANCARLO  Pt Calls for Assistance: Yes  Taken 2/2/2020 1303 by Tuyet Griffin R.N.  History of fall: 2  Intervention: Implement fall precautions  Flowsheets (Taken 2/8/2020 2130)  Environmental Precautions: Treaded Slipper Socks on Patient;Personal Belongings, Wastebasket, Call Bell etc. in Easy Reach;Transferred to Stronger Side;Report Given to Other Health Care Providers Regarding Fall Risk;Bed in Low Position;Communication Sign for Patients & Families;Mobility Assessed & Appropriate Sign Placed     Problem: Mobility  Goal: Risk for activity intolerance will decrease  Outcome: PROGRESSING SLOWER THAN EXPECTED  Note:   Pt continues to decline ambulation.     Problem: Urinary Elimination:  Goal: Ability to reestablish a normal urinary elimination pattern will improve  Outcome: PROGRESSING SLOWER THAN EXPECTED  Note:   Pt continues to want to use purewick rather than use bathroom.

## 2020-02-10 ENCOUNTER — TELEPHONE (OUTPATIENT)
Dept: HEALTH INFORMATION MANAGEMENT | Facility: OTHER | Age: 80
End: 2020-02-10

## 2020-02-10 VITALS
HEIGHT: 61 IN | BODY MASS INDEX: 28.87 KG/M2 | OXYGEN SATURATION: 94 % | DIASTOLIC BLOOD PRESSURE: 47 MMHG | HEART RATE: 58 BPM | TEMPERATURE: 98.4 F | SYSTOLIC BLOOD PRESSURE: 138 MMHG | WEIGHT: 152.89 LBS | RESPIRATION RATE: 18 BRPM

## 2020-02-10 PROBLEM — N39.0 UTI (URINARY TRACT INFECTION): Status: RESOLVED | Noted: 2020-02-03 | Resolved: 2020-02-10

## 2020-02-10 PROBLEM — A41.9 SEPSIS (HCC): Status: RESOLVED | Noted: 2020-02-03 | Resolved: 2020-02-10

## 2020-02-10 LAB
GAMMA INTERFERON BACKGROUND BLD IA-ACNC: 0.05 IU/ML
M TB IFN-G BLD-IMP: NEGATIVE
M TB IFN-G CD4+ BCKGRND COR BLD-ACNC: 0.03 IU/ML
MITOGEN IGNF BCKGRD COR BLD-ACNC: 5.8 IU/ML
QFT TB2 - NIL TBQ2: 0 IU/ML

## 2020-02-10 PROCEDURE — 99239 HOSP IP/OBS DSCHRG MGMT >30: CPT | Performed by: INTERNAL MEDICINE

## 2020-02-10 PROCEDURE — 700102 HCHG RX REV CODE 250 W/ 637 OVERRIDE(OP): Performed by: HOSPITALIST

## 2020-02-10 PROCEDURE — A9270 NON-COVERED ITEM OR SERVICE: HCPCS | Performed by: HOSPITALIST

## 2020-02-10 PROCEDURE — 97535 SELF CARE MNGMENT TRAINING: CPT

## 2020-02-10 RX ADMIN — PRAVASTATIN SODIUM 40 MG: 20 TABLET ORAL at 06:18

## 2020-02-10 RX ADMIN — ACETAMINOPHEN 650 MG: 325 TABLET, FILM COATED ORAL at 06:26

## 2020-02-10 RX ADMIN — LISINOPRIL 10 MG: 5 TABLET ORAL at 06:18

## 2020-02-10 RX ADMIN — ATENOLOL 50 MG: 25 TABLET ORAL at 06:18

## 2020-02-10 RX ADMIN — AMLODIPINE BESYLATE 5 MG: 5 TABLET ORAL at 06:19

## 2020-02-10 NOTE — DISCHARGE SUMMARY
Discharge Summary    CHIEF COMPLAINT ON ADMISSION  Chief Complaint   Patient presents with   • Weakness       Reason for Admission  Weakness     Admission Date  2/2/2020    CODE STATUS  Full Code    HPI & HOSPITAL COURSE   She was found to have a UTI and related sepsis. She was treated with fluids and antibiotics. She also has a significant history of a distal radium fracture s/p ORIF a week prior to this admission. Her activity level has been poor at home and her  is unable to care for her. She was also found to have hyperthyroidism with a suppressed TSH but normal T4 level. She was treated with sepsis protocols , IV fluids and antibiotics.   Her urinary infection cleared but she was weak and had difficulty getting up to standing from any sitting position due to her left arm fracture. The patient was decided for assisted living with home with home health services for discharge.    Therefore, she is discharged in good and stable condition to home with organized home healthcare and close outpatient follow-up.    The patient met 2-midnight criteria for an inpatient stay at the time of discharge.    Discharge Date  2/10/2020    FOLLOW UP ITEMS POST DISCHARGE  Primary care provider as soon as possible  Orthopedic surgery in two weeks for follow up on healing    DISCHARGE DIAGNOSES  Active Problems:    Essential hypertension POA: Yes    Generalized weakness POA: Yes    Distal radius fracture POA: Yes    Hyperthyroidism POA: Yes  Resolved Problems:    Sepsis (HCC) POA: Yes    UTI (urinary tract infection) POA: Yes      FOLLOW UP  No future appointments.  Bolivar Basurto D.O.  [address] [id number] [phone]            MEDICATIONS ON DISCHARGE     Medication List      START taking these medications      Instructions   acetaminophen 325 MG Tabs  Commonly known as:  TYLENOL   Take 2 Tabs by mouth every 6 hours as needed (Mild Pain; (Pain scale 1-3); Temp greater than 100.5 F).  Dose:  650 mg         CHANGE how you take these medications      Instructions   atenolol 50 MG Tabs  What changed:  when to take this  Commonly known as:  TENORMIN   Take 1 Tab by mouth every day.  Dose:  50 mg        CONTINUE taking these medications      Instructions   alendronate 70 MG Tabs  Commonly known as:  FOSAMAX   Take 70 mg by mouth every 7 days. On Monday  Dose:  70 mg     amLODIPine 5 MG Tabs  Commonly known as:  NORVASC   Take 5 mg by mouth 2 Times a Day.  Dose:  5 mg     CALCIUM PO   Take 1 Tab by mouth every day.  Dose:  1 Tab     ibuprofen 200 MG Tabs  Commonly known as:  MOTRIN   Take 400 mg by mouth every 6 hours as needed for Mild Pain.  Dose:  400 mg     IRON PO   Take 1 Tab by mouth every day.  Dose:  1 Tab     * lisinopril 40 MG tablet  Commonly known as:  PRINIVIL   Take 40 mg by mouth every day.  Dose:  40 mg     * lisinopril 10 MG Tabs  Commonly known as:  PRINIVIL   Take 1 Tab by mouth every day.  Dose:  10 mg     multivitamin Tabs   Take 1 Tab by mouth every day.  Dose:  1 Tab     pravastatin 40 MG tablet  Commonly known as:  PRAVACHOL   Take 40 mg by mouth every day.  Dose:  40 mg     VITAMIN C PO   Take 1 Tab by mouth every day.  Dose:  1 Tab     VITAMIN D3 PO   Take 1 Cap by mouth every day.  Dose:  1 Cap         * This list has 2 medication(s) that are the same as other medications prescribed for you. Read the directions carefully, and ask your doctor or other care provider to review them with you.            STOP taking these medications    HYDROcodone-acetaminophen 5-325 MG Tabs per tablet  Commonly known as:  NORCO            Allergies  No Known Allergies    DIET  Orders Placed This Encounter   Procedures   • Diet Order Regular     Standing Status:   Standing     Number of Occurrences:   1     Order Specific Question:   Diet:     Answer:   Regular [1]       ACTIVITY  As tolerated.  Weight bearing as tolerated    CONSULTATIONS  none    PROCEDURES  none    LABORATORY  Lab Results   Component Value  Date    SODIUM 141 02/04/2020    POTASSIUM 3.7 02/04/2020    CHLORIDE 105 02/04/2020    CO2 23 02/04/2020    GLUCOSE 111 (H) 02/04/2020    BUN 15 02/04/2020    CREATININE 0.69 02/04/2020        Lab Results   Component Value Date    WBC 7.9 02/04/2020    HEMOGLOBIN 10.4 (L) 02/04/2020    HEMATOCRIT 32.6 (L) 02/04/2020    PLATELETCT 231 02/04/2020        Total time of the discharge process exceeds 37 minutes.

## 2020-02-10 NOTE — PROGRESS NOTES
C/o intermittent 10/10 pain in left lateral foot. Great toe is red and below metatarsals is 1+ edema. Foot is floating on pillow while pt is in recliner.  Pt requested enema, refusing stool softeners because she states she believes the enema will cause less cramping. Pt educated on physiology of fecal movement through the bowels. Pt educated that enema, as well as the other bowel protocol measures, may cause cramping depending on the person and that enema is typically the most extreme and last effort in the bowel protocol. Pt verbalized understanding and agreed to start off with miralax. Pt reassured that staff will help her transfer to bedside commode as she has concerns of ambulating to bathroom due to the foot pain. Pt has had no BM this shift.

## 2020-02-10 NOTE — CARE PLAN
Problem: Safety  Goal: Will remain free from injury  Outcome: PROGRESSING AS EXPECTED     Problem: Venous Thromboembolism (VTW)/Deep Vein Thrombosis (DVT) Prevention:  Goal: Patient will participate in Venous Thrombosis (VTE)/Deep Vein Thrombosis (DVT)Prevention Measures  Outcome: PROGRESSING AS EXPECTED     Problem: Knowledge Deficit  Goal: Knowledge of disease process/condition, treatment plan, diagnostic tests, and medications will improve  Outcome: PROGRESSING AS EXPECTED     Problem: Discharge Barriers/Planning  Goal: Patient's continuum of care needs will be met  Outcome: PROGRESSING AS EXPECTED     Problem: Pain Management  Goal: Pain level will decrease to patient's comfort goal  Outcome: PROGRESSING AS EXPECTED     Problem: Mobility  Goal: Risk for activity intolerance will decrease  Outcome: PROGRESSING AS EXPECTED     Problem: Skin Integrity  Goal: Risk for impaired skin integrity will decrease  Outcome: PROGRESSING AS EXPECTED

## 2020-02-10 NOTE — PROGRESS NOTES
Pt DC to [facility], left in good and stable conditions, VSS, voided and have a BM prior discharged. IV removed-tolerated well. All her belongings taken and left unit via w/c via [employer] transportaion.

## 2020-02-10 NOTE — DISCHARGE PLANNING
Received Choice form at 2368  Agency/Facility Name: [facility]  Referral sent per Choice form @ 0998

## 2020-02-10 NOTE — DISCHARGE PLANNING
[facility] Name: [facility]  Outcome: no answer. Left VM to call [facility] back with update on referral.

## 2020-02-10 NOTE — DISCHARGE INSTRUCTIONS
Discharge Instructions    Discharged to [facility] by medical transportation with escort. Discharged via wheelchair, hospital escort: Yes.  Special equipment needed: Not Applicable    Be sure to schedule a follow-up appointment with your primary care doctor or any specialists as instructed.     Discharge Plan:   Diet Plan: Discussed  Activity Level: Discussed  Confirmed Follow up Appointment: Patient to Call and Schedule Appointment  Confirmed Symptoms Management: Discussed  Medication Reconciliation Updated: Yes  Influenza Vaccine Indication: Patient Refuses    I understand that a diet low in cholesterol, fat, and sodium is recommended for good health. Unless I have been given specific instructions below for another diet, I accept this instruction as my diet prescription.   Other diet: Regulat    Special Instructions: None    · Is patient discharged on Warfarin / Coumadin?   No     Depression / Suicide Risk    As you are discharged from this [facility] facility, it is important to learn how to keep safe from harming yourself.    Recognize the warning signs:  · Abrupt changes in personality, positive or negative- including increase in energy   · Giving away possessions  · Change in eating patterns- significant weight changes-  positive or negative  · Change in sleeping patterns- unable to sleep or sleeping all the time   · Unwillingness or inability to communicate  · Depression  · Unusual sadness, discouragement and loneliness  · Talk of wanting to die  · Neglect of personal appearance   · Rebelliousness- reckless behavior  · Withdrawal from people/activities they love  · Confusion- inability to concentrate     If you or a loved one observes any of these behaviors or has concerns about self-harm, here's what you can do:  · Talk about it- your feelings and reasons for harming yourself  · Remove any means that you might use to hurt yourself (examples: pills, rope, extension cords, firearm)  · Get  professional help from the community (Mental Health, Substance Abuse, psychological counseling)  · Do not be alone:Call your Safe Contact- someone whom you trust who will be there for you.  · Call your local CRISIS HOTLINE 477-8326 or 829-788-3222  · Call your local Children's Mobile Crisis Response Team [place] (054) 772-1812 or www.[employer] [facility]  · Call the toll free National Suicide Prevention Hotlines   · National Suicide Prevention Lifeline 791-747-IIOJ (4871)  · [employer] 800-SUICIDE (392-0507)      Discharge Instructions per Silvana Johnson M.D.    Follow up with primary care provider as soon as possible  Follow up with surgery for left arm healing in two weeks    DIET: regular    ACTIVITY: as tolerated with platform walker    DIAGNOSIS: left arm fracture    Return to ER if symptoms worsen

## 2020-02-10 NOTE — FACE TO FACE
Face to Face Supporting Documentation - Home Health    The encounter with this patient was in whole or in part the primary reason for home health admission.    Date of encounter:   Patient:                    MRN:                       YOB: 2020  Shama Herrera  [id number]  1940     Home health to see patient for:  Skilled Nursing care for assessment, interventions & education, Physical Therapy evaluation and treatment and Occupational therapy evaluation and treatment    Skilled need for:  Surgical Aftercare left arm surgery    Skilled nursing interventions to include:  Line/Drain/Airway education and care    Homebound status evidenced by:  Need the aid of supportive devices such as crutches, canes, wheelchairs or walkers. Leaving home requires a considerable and taxing effort. There is a normal inability to leave the home.    Community Physician to provide follow up care: Bolivar Basurto D.O.     Optional Interventions? No      I certify the face to face encounter for this home health care referral meets the CMS requirements and the encounter/clinical assessment with the patient was, in whole, or in part, for the medical condition(s) listed above, which is the primary reason for home health care. Based on my clinical findings: the service(s) are medically necessary, support the need for home health care, and the homebound criteria are met.  I certify that this patient has had a face to face encounter by myself.  Silvana Johnson M.D. - NPI: [id number]

## 2020-02-10 NOTE — FACE TO FACE
Face to Face Note  -  Durable Medical Equipment    Silvana Johnson M.D. - NPI: [id number]  I certify that this patient is under my care and that they had a durable medical equipment(DME)face to face encounter by myself that meets the physician DME face-to-face encounter requirements with this patient on:    Date of encounter:   Patient:                    MRN:                       YOB: 2020  Shama Herrera  [id number]  1940     The encounter with the patient was in whole, or in part, for the following medical condition, which is the primary reason for durable medical equipment:  Post-Op Surgery    I certify that, based on my findings, the following durable medical equipment is medically necessary:  Walkers and Other DME Equipment - platform for left arm attachment for walker.    HOME O2 Saturation Measurements:(Values must be present for Home Oxygen orders)         ,     ,         My Clinical findings support the need for the above equipment due to:  Abnormal Gait    Supporting Symptoms: unstable gait due to left arm fracture and inability to use left hand on walker

## 2020-02-10 NOTE — DISCHARGE PLANNING
Called son, Dangelo, to discuss DC planing. Dangelo states that pt is going to go to [facility] today and he has set up transportation at 1430. RN SNOYA attempted to explain to son that [facility] has not confirmed they can take the patient today and will want a negative Quantiferon Gold test which is still in process. Son stating he thinks the test will come back this afternoon and is going to keep the scheduled transportation time for now.

## 2020-02-10 NOTE — CARE PLAN
Problem: Safety  Goal: Will remain free from injury  Outcome: PROGRESSING AS EXPECTED  Goal: Will remain free from falls  Outcome: PROGRESSING AS EXPECTED  Intervention: Assess risk factors for falls  Flowsheets (Taken 2/8/2020 1400 by Valdez Hall C.N.GIANCARLO)  Pt Calls for Assistance: Yes  Intervention: Implement fall precautions  Flowsheets (Taken 2/9/2020 2030)  Environmental Precautions: Treaded Slipper Socks on Patient;Personal Belongings, Wastebasket, Call Bell etc. in Easy Reach;Report Given to Other Health Care Providers Regarding Fall Risk;Bed in Low Position;Communication Sign for Patients & Families;Mobility Assessed & Appropriate Sign Placed     Problem: Mobility  Goal: Risk for activity intolerance will decrease  Outcome: PROGRESSING SLOWER THAN EXPECTED  Note:   Pt refusing to ambulate. Education provided.     Problem: Urinary Elimination:  Goal: Ability to reestablish a normal urinary elimination pattern will improve  Outcome: PROGRESSING SLOWER THAN EXPECTED  Note:   Pt refusing to ambulate to bathroom. Purewick in place. Education provided.

## 2020-02-10 NOTE — THERAPY
Attempted PT txt, however, pt refused. Pt reports she is weaker and has pain in BLE and is limiting her to participate with therapy. Pt educated on benefits of therapy which can assist with pain managment and improve her strength. However, pt continued to refused despite education and reports she is not able to ambulate or attempt standing because she is phycially incapable of perfromring it. Per pervious therapy session pt has been able to ambulate in hallways, however, pt appears to be limiting herself. Will re-attempt as able.    no

## 2020-02-10 NOTE — PROGRESS NOTES
Report received from night shift RN. Assume care. Pt. AAOx4 pt is on cardiac chair ,  Assessment completed. VSS. C/o leg pain-refused  APAP-states she already have some, states will let me know about time for DC later after  her  makes some phone calls. Would like to have a FWW for DC. Pt was update for the care for the day. White board updated, All question answered. Pt has call light within reach,  bed is in the lowest position. Pt has no other needs at this time.

## 2020-02-25 NOTE — DOCUMENTATION QUERY
"                                                                         [facility]                                                                       Query Response Note      PATIENT:               KEELY VALVERDE  ACCT #:                  [id number]  MRN:                     [id number]  :                      1940  ADMIT DATE:       2020 9:52 AM  DISCH DATE:        2/10/2020 2:14 PM  RESPONDING  PROVIDER #:        [id number]           QUERY TEXT:    It is unclear whether Sepsis was present on admission.  Your help is needed.  Please clarify the POA status.    The patient's Clinical Indicators include:  Patient is a 80 y/o female presenting with significant fatigue, recently suffered a left radial fracture.  Documentation indicates patient \"was found to have\" a UTI and sepsis, however there is conflicting documentation regarding the present on admission (POA) status of these conditions.    Patient was treated with IV antibiotics and fluids.    ED VITAL SIGNS: BP (!) 170/73   Pulse 82   Temp 36.8 °C (98.2 °F) (Temporal)   Resp 18   SpO2 93%    WBC 12.7, Neutrophils (abs) 10.66  Options provided:   -- Sepsis was present on admission   -- Sepsis developed after admission   -- Unable to determine      Query created by: Akanksha Mcdaniel on 2020 8:06 AM    RESPONSE TEXT:    Sepsis was present on admission          Electronically signed by:  CORNELIUS MARES MD 2020 7:14 AM              "

## 2021-01-14 DIAGNOSIS — Z23 NEED FOR VACCINATION: ICD-10-CM

## 2022-10-05 ENCOUNTER — APPOINTMENT (RX ONLY)
Dept: URBAN - METROPOLITAN AREA CLINIC 6 | Facility: CLINIC | Age: 82
Setting detail: DERMATOLOGY
End: 2022-10-05

## 2022-10-05 DIAGNOSIS — L82.1 OTHER SEBORRHEIC KERATOSIS: ICD-10-CM

## 2022-10-05 DIAGNOSIS — L21.8 OTHER SEBORRHEIC DERMATITIS: ICD-10-CM | Status: INADEQUATELY CONTROLLED

## 2022-10-05 PROCEDURE — ? COUNSELING

## 2022-10-05 PROCEDURE — ? PRESCRIPTION

## 2022-10-05 PROCEDURE — ? ADDITIONAL NOTES

## 2022-10-05 PROCEDURE — 99214 OFFICE O/P EST MOD 30 MIN: CPT

## 2022-10-05 RX ORDER — FLUOCINONIDE 0.5 MG/ML
SOLUTION TOPICAL BID
Qty: 60 | Refills: 4 | Status: ERX | COMMUNITY
Start: 2022-10-05

## 2022-10-05 RX ADMIN — FLUOCINONIDE: 0.5 SOLUTION TOPICAL at 00:00

## 2022-10-05 ASSESSMENT — LOCATION SIMPLE DESCRIPTION DERM
LOCATION SIMPLE: LEFT FOREHEAD
LOCATION SIMPLE: POSTERIOR SCALP

## 2022-10-05 ASSESSMENT — LOCATION DETAILED DESCRIPTION DERM
LOCATION DETAILED: POSTERIOR MID-PARIETAL SCALP
LOCATION DETAILED: LEFT SUPERIOR FOREHEAD

## 2022-10-05 ASSESSMENT — LOCATION ZONE DERM
LOCATION ZONE: FACE
LOCATION ZONE: SCALP

## 2022-10-05 NOTE — PROCEDURE: ADDITIONAL NOTES
Detail Level: Simple
Additional Notes: Recommended OTC ant-dandruff shampoo on scalp and behind the ears\\nStart fluocinonide once daily as needed to itchy areas on scalp
Render Risk Assessment In Note?: no

## 2022-10-05 NOTE — PROCEDURE: MIPS QUALITY
Quality 226: Preventive Care And Screening: Tobacco Use: Screening And Cessation Intervention: Patient screened for tobacco use and is an ex/non-smoker
Detail Level: Detailed
Quality 130: Documentation Of Current Medications In The Medical Record: Current Medications Documented
Quality 431: Preventive Care And Screening: Unhealthy Alcohol Use - Screening: Patient not identified as an unhealthy alcohol user when screened for unhealthy alcohol use using a systematic screening method
Quality 111:Pneumonia Vaccination Status For Older Adults: Pneumococcal vaccine (PPSV23) administered on or after patient’s 60th birthday and before the end of the measurement period